# Patient Record
Sex: FEMALE | Race: WHITE | NOT HISPANIC OR LATINO | Employment: FULL TIME | URBAN - METROPOLITAN AREA
[De-identification: names, ages, dates, MRNs, and addresses within clinical notes are randomized per-mention and may not be internally consistent; named-entity substitution may affect disease eponyms.]

---

## 2016-09-14 LAB — HBA1C MFR BLD HPLC: 6.1 %

## 2018-03-28 ENCOUNTER — OFFICE VISIT (OUTPATIENT)
Dept: ENDOCRINOLOGY | Facility: HOSPITAL | Age: 48
End: 2018-03-28
Payer: COMMERCIAL

## 2018-03-28 VITALS
HEART RATE: 94 BPM | HEIGHT: 68 IN | BODY MASS INDEX: 31.52 KG/M2 | SYSTOLIC BLOOD PRESSURE: 130 MMHG | DIASTOLIC BLOOD PRESSURE: 92 MMHG | WEIGHT: 208 LBS

## 2018-03-28 DIAGNOSIS — E23.6 PITUITARY CYST (HCC): Primary | ICD-10-CM

## 2018-03-28 DIAGNOSIS — E22.1 HYPERPROLACTINEMIA (HCC): ICD-10-CM

## 2018-03-28 DIAGNOSIS — D49.7 PITUITARY TUMOR: ICD-10-CM

## 2018-03-28 PROCEDURE — 99203 OFFICE O/P NEW LOW 30 MIN: CPT | Performed by: INTERNAL MEDICINE

## 2018-03-28 RX ORDER — SALICYLIC ACID
POWDER (GRAM) MISCELLANEOUS
COMMUNITY
End: 2019-09-09 | Stop reason: ALTCHOICE

## 2018-03-28 RX ORDER — CHLORAL HYDRATE 500 MG
1000 CAPSULE ORAL DAILY
COMMUNITY

## 2018-03-28 NOTE — LETTER
March 28, 2018     Amada Sinha MD  [address]    Patient: Yuridia Barrientos   YOB: 1970   Date of Visit: 3/28/2018       Dear Dr Pena Sample: Thank you for referring Yuridia Barrientos to me for evaluation  Below are my notes for this consultation  If you have questions, please do not hesitate to call me  I look forward to following your patient along with you  Sincerely,        Renee Perry MD        CC: No Recipients  Renee Perry MD  3/28/2018 11:03 AM  Sign at close encounter  3/28/2018    Assessment/Plan      Diagnoses and all orders for this visit:    Pituitary cyst ([facility] 75 )  -     CBC and differential Lab Collect; Future  -     Comprehensive metabolic panel Lab Collect; Future  -     Prolactin Lab Collect; Future  -     Insulin-like growth factor Lab Collect; Future  -     T4, free Lab Collect; Future  -     TSH, 3rd generation Lab Collect; Future  -     MRI brain pituitary wo and w contrast; Future    Pituitary tumor  -     CBC and differential Lab Collect; Future  -     Comprehensive metabolic panel Lab Collect; Future  -     Prolactin Lab Collect; Future  -     Insulin-like growth factor Lab Collect; Future  -     T4, free Lab Collect; Future  -     TSH, 3rd generation Lab Collect; Future  -     MRI brain pituitary wo and w contrast; Future    Hyperprolactinemia ([facility] )  -     Prolactin Lab Collect; Future    Other orders  -     Phentermine HCl 30 MG TBDP; Take by mouth  -     Omega-3 Fatty Acids (FISH OIL) 1,000 mg; Take 1,000 mg by mouth daily  -     ALBUTEROL SULFATE HFA IN; Inhale  -     Probiotic Product (PROBIOTIC-10 PO); Take by mouth  -     Multiple Vitamins-Minerals (MULTIVITAMIN ADULT PO); Take by mouth  -     Lavender Oil OIL; by Does not apply route daily at bedtime        Assessment/Plan:  1  Pituitary cyst   Last MRI showed stable size pituitary cyst   This will be followed over time with serial MRIs and blood work    2   Pituitary microadenoma  She has a slightly elevated prolactin level, it could be from the microadenoma, but as it is not causing any symptomatology, we will not treat this other than observation  There are no other pituitary hormonal abnormalities  This will be followed with serial MRIs  3   Mild hyperprolactinemia  Her prolactin level is very slightly above normal  It is not causing any symptomatology such as galactorrhea or abnormal menses  No treatment is necessary at this time  This level will be followed over time  I will have her follow up in 1 year with preceding CBC, CMP, TSH, free T4, prolactin, IGF-1 level, and MRI of the pituitary with and without contrast       CC: pituitary consult    History of Present Illness     HPI: Yuridia Barrientos is a 50y o  year old female with history of mild hyperprolactinemia with a pituitary tumor and a pituitary cyst on MRI  In 2015, her gynecologist did a prolactin when she had irregular menses which was abnormal   An MRI was then done and blood work showed only mild hyperprolactinemia  A follow-up MRI in 2016 showed no change in size of her pituitary cyst or pituitary tumor  At that point, prolactin levels had normalized  She has no heat or cold intolerance  She denies palpitation, tremors, or anxiety  She wakes up early at 4:00 a m  to exercise and wakes up a lot in general   She has some more moments at night  She has had recent diarrhea once a month around her menses, but no constipation  She has some dry skin and hair loss, but no brittle nails  She does complain of fatigue  She had been unable to lose weight with diet and exercise, she went on phentermine in October of 2017 and has lost between 8 and 10 lb so far  She has no diplopia or blurry vision  She has no difficulties with swallowing  Menstrual cycles are occurring on a regular monthly basis  She denies galactorrhea  She has no orthostatics symptoms or headache    She has no abdominal pain or nausea  Review of Systems   Constitutional: Positive for fatigue and unexpected weight change  Unable to lose weight with work out and diet changes, on phentermine since October 2017, lost 8-10 lbs so far  HENT: Negative for ear pain, hearing loss, tinnitus and trouble swallowing  Eyes: Negative for visual disturbance  Some worsening reading vision  No loss of vision, blurry vision, diplopia  Respiratory: Negative for chest tightness and shortness of breath  Cardiovascular: Negative for chest pain, palpitations and leg swelling  Gastrointestinal: Positive for diarrhea  Negative for abdominal pain, constipation and nausea  1 day around messes with diarrhea, constipation with phentermine  Endocrine: Negative for cold intolerance, heat intolerance, polydipsia and polyuria  Some hot moments at night  Genitourinary:        Menses regular on a monthly basis  No galactorrhea  Musculoskeletal: Negative for arthralgias and back pain  Skin: Negative for rash  Has some dry skin and hair loss, but no brittle nails  Neurological: Negative for dizziness, tremors, weakness, light-headedness, numbness and headaches  Psychiatric/Behavioral: Positive for sleep disturbance  The patient is not nervous/anxious  Wakes a few times, wakes at 4 am to exercise         Historical Information   Past Medical History:   Diagnosis Date    Asthma     exercise induced    Osteoarthritis     left knee     Past Surgical History:   Procedure Laterality Date    APPENDECTOMY      CYST REMOVAL Right     groin    HAND SURGERY Left     post trauma    KNEE ARTHROSCOPY Left      Social History   History   Alcohol Use No     History   Drug Use No     History   Smoking Status    Never Smoker   Smokeless Tobacco    Never Used     Family History:   Family History   Problem Relation Age of Onset    Melanoma Mother     Hypertension Father     Breast cancer Sister     Hyperlipidemia Sister  No Known Problems Sister     No Known Problems Sister     Polycystic ovary syndrome Daughter     No Known Problems Son        Meds/Allergies   Current Outpatient Prescriptions   Medication Sig Dispense Refill    ALBUTEROL SULFATE HFA IN Inhale      Lavender Oil OIL by Does not apply route daily at bedtime      Multiple Vitamins-Minerals (MULTIVITAMIN ADULT PO) Take by mouth      Omega-3 Fatty Acids (FISH OIL) 1,000 mg Take 1,000 mg by mouth daily      Phentermine HCl 30 MG TBDP Take by mouth      Probiotic Product (PROBIOTIC-10 PO) Take by mouth       No current facility-administered medications for this visit  No Known Allergies    Objective   Vitals: Blood pressure 130/92, pulse 94, height 5' 8 11" (1 73 m), weight 94 3 kg (208 lb)  Invasive Devices          No matching active lines, drains, or airways          Physical Exam   Constitutional: She is oriented to person, place, and time  She appears well-developed and well-nourished  HENT:   Head: Normocephalic and atraumatic  Eyes: Conjunctivae and EOM are normal  Pupils are equal, round, and reactive to light  No lid lag, stare, proptosis, or periorbital  Visual fields intact to confrontation  Neck: Normal range of motion  Neck supple  No thyromegaly present  No carotid bruits  Cardiovascular: Normal rate, regular rhythm, normal heart sounds and intact distal pulses  No murmur heard  Pulmonary/Chest: Effort normal and breath sounds normal  She has no wheezes  Abdominal: Soft  Bowel sounds are normal  There is no tenderness  Musculoskeletal: Normal range of motion  She exhibits no edema or deformity  No tremor of the outstretched hands  No CVAT  No spinous process tenderness  Lymphadenopathy:     She has no cervical adenopathy  Neurological: She is alert and oriented to person, place, and time  She has normal reflexes  Skin: Skin is warm and dry  No rash noted  Vitals reviewed        The history was obtained from the review of the chart and from the patient  Lab Results:   Blood work done on February 21, 2018 showed a normal CMP with a glucose of 87, and sodium of 138  TSH was 1 53 with free T4 1 0  IGF-1 level was 95  Prolactin was 30 8 with normals up to 30  MRI done on 05/04/2016 showed a 2-3 mm pituitary cyst in the left side of the pituitary and a 5 mm microadenoma in the inferior aspect  These were unchanged from MRI in 2015  No results found for this or any previous visit (from the past 18278 hour(s))        Future Appointments  Date Time Provider Kash Thorpe   4/3/2019 8:40 AM Trent Hernandez MD [facility]

## 2018-03-28 NOTE — PROGRESS NOTES
3/28/2018    Assessment/Plan      Diagnoses and all orders for this visit:    Pituitary cyst ([facility] 75 )  -     CBC and differential Lab Collect; Future  -     Comprehensive metabolic panel Lab Collect; Future  -     Prolactin Lab Collect; Future  -     Insulin-like growth factor Lab Collect; Future  -     T4, free Lab Collect; Future  -     TSH, 3rd generation Lab Collect; Future  -     MRI brain pituitary wo and w contrast; Future    Pituitary tumor  -     CBC and differential Lab Collect; Future  -     Comprehensive metabolic panel Lab Collect; Future  -     Prolactin Lab Collect; Future  -     Insulin-like growth factor Lab Collect; Future  -     T4, free Lab Collect; Future  -     TSH, 3rd generation Lab Collect; Future  -     MRI brain pituitary wo and w contrast; Future    Hyperprolactinemia ([facility] )  -     Prolactin Lab Collect; Future    Other orders  -     Phentermine HCl 30 MG TBDP; Take by mouth  -     Omega-3 Fatty Acids (FISH OIL) 1,000 mg; Take 1,000 mg by mouth daily  -     ALBUTEROL SULFATE HFA IN; Inhale  -     Probiotic Product (PROBIOTIC-10 PO); Take by mouth  -     Multiple Vitamins-Minerals (MULTIVITAMIN ADULT PO); Take by mouth  -     Lavender Oil OIL; by Does not apply route daily at bedtime        Assessment/Plan:  1  Pituitary cyst   Last MRI showed stable size pituitary cyst   This will be followed over time with serial MRIs and blood work  2   Pituitary microadenoma  She has a slightly elevated prolactin level, it could be from the microadenoma, but as it is not causing any symptomatology, we will not treat this other than observation  There are no other pituitary hormonal abnormalities  This will be followed with serial MRIs  3   Mild hyperprolactinemia  Her prolactin level is very slightly above normal  It is not causing any symptomatology such as galactorrhea or abnormal menses  No treatment is necessary at this time  This level will be followed over time    I will have her follow up in 1 year with preceding CBC, CMP, TSH, free T4, prolactin, IGF-1 level, and MRI of the pituitary with and without contrast       CC: pituitary consult    History of Present Illness     HPI: Delia Multani is a 50y o  year old female with history of mild hyperprolactinemia with a pituitary tumor and a pituitary cyst on MRI  In 2015, her gynecologist did a prolactin when she had irregular menses which was abnormal   An MRI was then done and blood work showed only mild hyperprolactinemia  A follow-up MRI in 2016 showed no change in size of her pituitary cyst or pituitary tumor  At that point, prolactin levels had normalized  She has no heat or cold intolerance  She denies palpitation, tremors, or anxiety  She wakes up early at 4:00 a m  to exercise and wakes up a lot in general   She has some more moments at night  She has had recent diarrhea once a month around her menses, but no constipation  She has some dry skin and hair loss, but no brittle nails  She does complain of fatigue  She had been unable to lose weight with diet and exercise, she went on phentermine in October of 2017 and has lost between 8 and 10 lb so far  She has no diplopia or blurry vision  She has no difficulties with swallowing  Menstrual cycles are occurring on a regular monthly basis  She denies galactorrhea  She has no orthostatics symptoms or headache  She has no abdominal pain or nausea  Review of Systems   Constitutional: Positive for fatigue and unexpected weight change  Unable to lose weight with work out and diet changes, on phentermine since October 2017, lost 8-10 lbs so far  HENT: Negative for ear pain, hearing loss, tinnitus and trouble swallowing  Eyes: Negative for visual disturbance  Some worsening reading vision  No loss of vision, blurry vision, diplopia  Respiratory: Negative for chest tightness and shortness of breath  Cardiovascular: Negative for chest pain, palpitations and leg swelling  Gastrointestinal: Positive for diarrhea  Negative for abdominal pain, constipation and nausea  1 day around messes with diarrhea, constipation with phentermine  Endocrine: Negative for cold intolerance, heat intolerance, polydipsia and polyuria  Some hot moments at night  Genitourinary:        Menses regular on a monthly basis  No galactorrhea  Musculoskeletal: Negative for arthralgias and back pain  Skin: Negative for rash  Has some dry skin and hair loss, but no brittle nails  Neurological: Negative for dizziness, tremors, weakness, light-headedness, numbness and headaches  Psychiatric/Behavioral: Positive for sleep disturbance  The patient is not nervous/anxious  Wakes a few times, wakes at 4 am to exercise         Historical Information   Past Medical History:   Diagnosis Date    Asthma     exercise induced    Osteoarthritis     left knee     Past Surgical History:   Procedure Laterality Date    APPENDECTOMY      CYST REMOVAL Right     groin    HAND SURGERY Left     post trauma    KNEE ARTHROSCOPY Left      Social History   History   Alcohol Use No     History   Drug Use No     History   Smoking Status    Never Smoker   Smokeless Tobacco    Never Used     Family History:   Family History   Problem Relation Age of Onset   [facility] Melanoma Mother     Hypertension Father     Breast cancer Sister     Hyperlipidemia Sister     No Known Problems Sister     No Known Problems Sister     Polycystic ovary syndrome Daughter     No Known Problems Son        Meds/Allergies   Current Outpatient Prescriptions   Medication Sig Dispense Refill    ALBUTEROL SULFATE HFA IN Inhale      Lavender Oil OIL by Does not apply route daily at bedtime      Multiple Vitamins-Minerals (MULTIVITAMIN ADULT PO) Take by mouth      Omega-3 Fatty Acids (FISH OIL) 1,000 mg Take 1,000 mg by mouth daily      Phentermine HCl 30 MG TBDP Take by mouth      Probiotic Product (PROBIOTIC-10 PO) Take by mouth       No current facility-administered medications for this visit  No Known Allergies    Objective   Vitals: Blood pressure 130/92, pulse 94, height 5' 8 11" (1 73 m), weight 94 3 kg (208 lb)  Invasive Devices          No matching active lines, drains, or airways          Physical Exam   Constitutional: She is oriented to person, place, and time  She appears well-developed and well-nourished  HENT:   Head: Normocephalic and atraumatic  Eyes: Conjunctivae and EOM are normal  Pupils are equal, round, and reactive to light  No lid lag, stare, proptosis, or periorbital  Visual fields intact to confrontation  Neck: Normal range of motion  Neck supple  No thyromegaly present  No carotid bruits  Cardiovascular: Normal rate, regular rhythm, normal heart sounds and intact distal pulses  No murmur heard  Pulmonary/Chest: Effort normal and breath sounds normal  She has no wheezes  Abdominal: Soft  Bowel sounds are normal  There is no tenderness  Musculoskeletal: Normal range of motion  She exhibits no edema or deformity  No tremor of the outstretched hands  No CVAT  No spinous process tenderness  Lymphadenopathy:     She has no cervical adenopathy  Neurological: She is alert and oriented to person, place, and time  She has normal reflexes  Skin: Skin is warm and dry  No rash noted  Vitals reviewed  The history was obtained from the review of the chart and from the patient  Lab Results:   Blood work done on February 21, 2018 showed a normal CMP with a glucose of 87, and sodium of 138  TSH was 1 53 with free T4 1 0  IGF-1 level was 95  Prolactin was 30 8 with normals up to 30  MRI done on 05/04/2016 showed a 2-3 mm pituitary cyst in the left side of the pituitary and a 5 mm microadenoma in the inferior aspect  These were unchanged from MRI in 2015  No results found for this or any previous visit (from the past 96141 hour(s))        Future Appointments  Date Time Provider Kash Thorpe   4/3/2019 8:40 AM Kiara Andres MD [facility]

## 2018-03-28 NOTE — PATIENT INSTRUCTIONS
Blood work normal except slightly elevated prolactin that is nonworrisome and not affecting her your health  Will need MRI before next visit  Follow up in 1 year with blood work

## 2018-12-05 ENCOUNTER — TELEPHONE (OUTPATIENT)
Dept: ENDOCRINOLOGY | Facility: HOSPITAL | Age: 48
End: 2018-12-05

## 2018-12-05 NOTE — TELEPHONE ENCOUNTER
Called for Authorization and spoke to Alberta ALMEIDA at 5:05PM and he stated that this does not need pre-certification

## 2018-12-05 NOTE — TELEPHONE ENCOUNTER
Received call from Ho at [facility], [facility], patient is scheduled for MRI tomorrow, their NPI is [id number]  The MRI needs an authorization

## 2018-12-05 NOTE — TELEPHONE ENCOUNTER
All you have to do is call her insurance and initiate a authorization  They ask you questions over the phone

## 2018-12-05 NOTE — TELEPHONE ENCOUNTER
Tried to call the number on the back of her card, none of the options were for pre-certifications or authorizations for a procedure   I tried two different options and they both were incorrect

## 2018-12-18 ENCOUNTER — TELEPHONE (OUTPATIENT)
Dept: ENDOCRINOLOGY | Facility: HOSPITAL | Age: 48
End: 2018-12-18

## 2019-03-24 LAB
ALBUMIN SERPL-MCNC: 4 G/DL (ref 3.6–5.1)
ALBUMIN/GLOB SERPL: 1.3 (CALC) (ref 1–2.5)
ALP SERPL-CCNC: 78 U/L (ref 33–115)
ALT SERPL-CCNC: 14 U/L (ref 6–29)
AST SERPL-CCNC: 14 U/L (ref 10–35)
BASOPHILS # BLD AUTO: 43 CELLS/UL (ref 0–200)
BASOPHILS NFR BLD AUTO: 0.5 %
BILIRUB SERPL-MCNC: 0.5 MG/DL (ref 0.2–1.2)
BUN SERPL-MCNC: 13 MG/DL (ref 7–25)
BUN/CREAT SERPL: NORMAL (CALC) (ref 6–22)
CALCIUM SERPL-MCNC: 9.5 MG/DL (ref 8.6–10.2)
CHLORIDE SERPL-SCNC: 101 MMOL/L (ref 98–110)
CO2 SERPL-SCNC: 29 MMOL/L (ref 20–32)
CREAT SERPL-MCNC: 0.72 MG/DL (ref 0.5–1.1)
EOSINOPHIL # BLD AUTO: 292 CELLS/UL (ref 15–500)
EOSINOPHIL NFR BLD AUTO: 3.4 %
ERYTHROCYTE [DISTWIDTH] IN BLOOD BY AUTOMATED COUNT: 11.9 % (ref 11–15)
GLOBULIN SER CALC-MCNC: 3.1 G/DL (CALC) (ref 1.9–3.7)
GLUCOSE SERPL-MCNC: 95 MG/DL (ref 65–99)
HCT VFR BLD AUTO: 40.3 % (ref 35–45)
HGB BLD-MCNC: 13.1 G/DL (ref 11.7–15.5)
IGF-I SERPL-MCNC: 100 NG/ML (ref 52–328)
IGF-I Z-SCORE SERPL: -0.7 SD
LYMPHOCYTES # BLD AUTO: 2245 CELLS/UL (ref 850–3900)
LYMPHOCYTES NFR BLD AUTO: 26.1 %
MCH RBC QN AUTO: 27.6 PG (ref 27–33)
MCHC RBC AUTO-ENTMCNC: 32.5 G/DL (ref 32–36)
MCV RBC AUTO: 84.8 FL (ref 80–100)
MONOCYTES # BLD AUTO: 516 CELLS/UL (ref 200–950)
MONOCYTES NFR BLD AUTO: 6 %
NEUTROPHILS # BLD AUTO: 5504 CELLS/UL (ref 1500–7800)
NEUTROPHILS NFR BLD AUTO: 64 %
PLATELET # BLD AUTO: 451 THOUSAND/UL (ref 140–400)
PMV BLD REES-ECKER: 10.6 FL (ref 7.5–12.5)
POTASSIUM SERPL-SCNC: 4.3 MMOL/L (ref 3.5–5.3)
PROLACTIN SERPL-MCNC: 26.5 NG/ML
PROT SERPL-MCNC: 7.1 G/DL (ref 6.1–8.1)
RBC # BLD AUTO: 4.75 MILLION/UL (ref 3.8–5.1)
SL AMB EGFR AFRICAN AMERICAN: 114 ML/MIN/1.73M2
SL AMB EGFR NON AFRICAN AMERICAN: 98 ML/MIN/1.73M2
SODIUM SERPL-SCNC: 137 MMOL/L (ref 135–146)
T4 FREE SERPL-MCNC: 0.9 NG/DL (ref 0.8–1.8)
TSH SERPL-ACNC: 1.92 MIU/L
WBC # BLD AUTO: 8.6 THOUSAND/UL (ref 3.8–10.8)

## 2019-04-03 ENCOUNTER — OFFICE VISIT (OUTPATIENT)
Dept: ENDOCRINOLOGY | Facility: HOSPITAL | Age: 49
End: 2019-04-03
Payer: COMMERCIAL

## 2019-04-03 VITALS
HEART RATE: 82 BPM | BODY MASS INDEX: 30.77 KG/M2 | HEIGHT: 68 IN | SYSTOLIC BLOOD PRESSURE: 130 MMHG | DIASTOLIC BLOOD PRESSURE: 92 MMHG | WEIGHT: 203 LBS

## 2019-04-03 DIAGNOSIS — D49.7 PITUITARY TUMOR: Primary | ICD-10-CM

## 2019-04-03 DIAGNOSIS — E23.6 PITUITARY CYST (HCC): ICD-10-CM

## 2019-04-03 DIAGNOSIS — E22.1 HYPERPROLACTINEMIA (HCC): ICD-10-CM

## 2019-04-03 PROCEDURE — 99214 OFFICE O/P EST MOD 30 MIN: CPT | Performed by: INTERNAL MEDICINE

## 2019-09-06 ENCOUNTER — DOCUMENTATION (OUTPATIENT)
Dept: HEMATOLOGY ONCOLOGY | Facility: CLINIC | Age: 49
End: 2019-09-06

## 2019-09-06 ENCOUNTER — TELEPHONE (OUTPATIENT)
Dept: SURGICAL ONCOLOGY | Facility: CLINIC | Age: 49
End: 2019-09-06

## 2019-09-06 PROBLEM — C50.412 MALIGNANT NEOPLASM OF UPPER-OUTER QUADRANT OF LEFT BREAST IN FEMALE, ESTROGEN RECEPTOR POSITIVE (HCC): Status: ACTIVE | Noted: 2019-09-06

## 2019-09-06 PROBLEM — Z17.0 MALIGNANT NEOPLASM OF UPPER-OUTER QUADRANT OF LEFT BREAST IN FEMALE, ESTROGEN RECEPTOR POSITIVE (HCC): Status: ACTIVE | Noted: 2019-09-06

## 2019-09-06 NOTE — TELEPHONE ENCOUNTER
Called insurance & spoke to [address] [place] [address] call ref# [id number]   Pt has an active open access plus plan & [place] is in the pts network & par  The plan runs on a cal year w/an effective date of 12/01/15  Not a cobra plan  Pt has chemo & radiation benefits that have a $3000 deduct that has been met  Co-ions is 80/20 until the out of pocket of $4000 is met  Pt has met $2141 60  Once the out of  Pocket is met then pt covered 100%  Chemo drugs are in the 80/20 no co-pays  pcp & specialist & ER has the 20% co-ins that is based on the allowed amount until the out of pocket is met  Dx imaging has a $50 co-pay then co-in that will go towards the out of pocket  X-rays & labs & in pt hospitalizations & out pt surgery is the same benefit the deduct then co-ins that will go towards the out of pocket  Labs are not capitated  Genetic testing is under the labs & nd codes for genomic testing  rx plan thru [facility] [phone] speciality pharmacy [facility]  Called pt to go over the benefits but got her voicemail  left her a message to call me back

## 2019-09-06 NOTE — PROGRESS NOTES
Pt called & I went over her benefits w/her & she sd that she will call me back after her visit to let me know what the treatment plan will be & then I will call back to the insurance to make sure it s all covered for the pt

## 2019-09-06 NOTE — TELEPHONE ENCOUNTER
New Patient Encounter    New Patient Intake Form   Patient Details:  Andreia Gore  1970  [id number]    Background Information:  [address] starts by opening a telephone encounter and gathering the following information   Who is calling to schedule? If not self, relationship to patient? self   Referring Provider self   What is the diagnosis? Breast ca   When was the diagnosis? 8/2019   Is patient aware of diagnosis? Yes   Reason for visit? Second Opinion   Have you had any testing done? If so: when, where? Yes [facility]   Are records in EPIC? no   Was the patient told to bring a disk? yes   Scheduling Information:   Preferred [place]: [facility]     Requesting Specific Provider? EDUARD   Are there any dates/time the patient cannot be seen? NO   Counseling Pre-Screen:  If the patient answers YES to any of the below questions, please route to the appropriate location specific counselor    Have you felt anxious or worried about cancer and the treatment you are receiving? No   Has your diagnosis caused physical, emotional, or financial hardship for you? No   Note: Do not ask the patient about transportation issues/needs  Please notate if the patient brings it up and the counselor will schedule accordingly  Miscellaneous: NA    After completing the above information, please route to Financial Counselor and the appropriate Nurse Navigator for review

## 2019-09-09 ENCOUNTER — CONSULT (OUTPATIENT)
Dept: SURGICAL ONCOLOGY | Facility: CLINIC | Age: 49
End: 2019-09-09
Payer: COMMERCIAL

## 2019-09-09 VITALS
SYSTOLIC BLOOD PRESSURE: 120 MMHG | HEART RATE: 94 BPM | HEIGHT: 68 IN | WEIGHT: 220 LBS | DIASTOLIC BLOOD PRESSURE: 80 MMHG | BODY MASS INDEX: 33.34 KG/M2 | RESPIRATION RATE: 16 BRPM | TEMPERATURE: 98.1 F

## 2019-09-09 DIAGNOSIS — Z17.0 MALIGNANT NEOPLASM OF UPPER-OUTER QUADRANT OF LEFT BREAST IN FEMALE, ESTROGEN RECEPTOR POSITIVE (HCC): Primary | ICD-10-CM

## 2019-09-09 DIAGNOSIS — Z80.3 FAMILY HISTORY OF BREAST CANCER: ICD-10-CM

## 2019-09-09 DIAGNOSIS — Z13.71 BRCA GENE MUTATION NEGATIVE: ICD-10-CM

## 2019-09-09 DIAGNOSIS — C50.412 MALIGNANT NEOPLASM OF UPPER-OUTER QUADRANT OF LEFT BREAST IN FEMALE, ESTROGEN RECEPTOR POSITIVE (HCC): Primary | ICD-10-CM

## 2019-09-09 PROCEDURE — 99245 OFF/OP CONSLTJ NEW/EST HI 55: CPT | Performed by: SURGERY

## 2019-09-09 NOTE — PROGRESS NOTES
Surgical Oncology Consult Note       [address] [facility],6[address]  [facility] [place]  [address] [facility] [address]  1970  [id number]  [address] [facility],[address]  [facility] [place]  [address] [facility] [id number]      Chief Complaint:     Chief Complaint   Patient presents with    Consult     Second opinion    Breast Cancer     New diagnosis       Assessment and Plan:   Assessment/Plan   Patient presents with a new diagnosis of left breast invasive cancer  She has 2 4 mm lesions approximately a cm apart  Strongly ER NV positive HER2 negative  She has had a prior consult and is having a 3rd consult [facility]  I have recommended breast conservation therapy  She was interested intraoperative radiation therapy however we typically would not perform this for someone of her age though there other centers in the country who would  She will contact us following her 3rd consult if we can provide her any additional information and/or services  All questions were answered to the patient's satisfaction  Oncology History:        Malignant neoplasm of upper-outer quadrant of left breast in female, estrogen receptor positive ([facility] [address] )    8/16/2019 Biopsy     Left breast ultrasound-guided biopsy  2 o'clock, 9 cm from nipple (2 closely adjacent masses)  Invasive mammary carcinoma, ductal type  Grade 2      HER2 1+    Both masses measure 4 mm on US  Left axilla US negative  Right breast clear  Biopsy done at [facility]      8/21/2019 Genetic Testing     The following genes were evaluated: APC, BEATRICE, AXIN2, BARD 1, BMPR1A, BRCA1, BRCA2, BRIP1, CDH1, CKD4, CDKN24, CHEK2, EPCAM, HOXB13, GALNT12, MLH1, MSH2, MSH3, MSH6, MUTYH, NBN, NTHL1, PALB2, PMS2, PTEN, RAD51C, RAD51D, RNF43, RPS20, SMAD4, STK11, TP53   Negative result   No pathogenic sequence variants or deletions/dupllications identified  [facility])         History of Present Illness: This is a 42-year-old woman who has a history of a prolactinoma and had a screening mammogram which demonstrated no findings on the right side however on the left side she had some abnormalities  She subsequent had a diagnostic mammogram and ultrasound which demonstrated in the upper outer quadrant ( 2:00 a m  9 cm from the nipple ) two 4 mm masses, the clips pain and 1 4 cm apart  These were biopsied and shown to be invasive ductal carcinoma grade 2 % % HER2 Ashli 1+  Evaluation of her axilla with ultrasound was negative  The patient has a sister with breast cancer the sisters genetically negative  The patient was also tested recently with the expanded panel and is negative  The patient has had an MRI which demonstrated no additional disease though  There is considerable activity in the MRI compromising its sensitivity,  However was able to  there are 2 small cancers  The patient's family history is remarkable for her sister having breast cancer both the sister and the patient  Review of Systems:   Review of Systems   Constitutional: Negative for activity change, appetite change and fatigue  HENT: Negative  Eyes: Negative  Respiratory: Negative for cough, shortness of breath and wheezing  Cardiovascular: Negative for chest pain and leg swelling  Gastrointestinal: Negative  Endocrine: Negative  Genitourinary: Negative  Musculoskeletal:        No new changes or complaints of bone pain   Skin: Negative  Allergic/Immunologic: Negative  Neurological: Negative  Hematological: Negative  Psychiatric/Behavioral: Negative          Past Medical History:      Patient Active Problem List   Diagnosis    Pituitary cyst ([address] )    Pituitary tumor    Hyperprolactinemia ([address] )    Malignant neoplasm of upper-outer quadrant of left breast in female, estrogen receptor positive ([address] )        Past Medical History:   Diagnosis Date    Asthma     exercise induced    BRCA gene mutation negative 08/21/2019    [facility])    History of prediabetes     Osteoarthritis     left knee        Past Surgical History:   Procedure Laterality Date    APPENDECTOMY      CYST REMOVAL Right     groin    HAND SURGERY Left     post trauma    KNEE ARTHROSCOPY Left         Family History   Problem Relation Age of Onset    Melanoma Mother 40    Hypertension Father     Breast cancer Sister 50    Hyperlipidemia Sister     No Known Problems Sister     No Known Problems Sister     Polycystic ovary syndrome Daughter     No Known Problems Son         Social History     Socioeconomic History    Marital status: /Civil Union     Spouse name: Not on file    Number of children: Not on file    Years of education: Not on file    Highest education level: Not on file   Occupational History    Occupation: billing, insurance/administration   Social Needs    Financial resource strain: Not on file    Food insecurity:     Worry: Not on file     Inability: Not on file    Transportation needs:     Medical: Not on file     Non-medical: Not on file   Tobacco Use    Smoking status: Never Smoker    Smokeless tobacco: Never Used   Substance and Sexual Activity    Alcohol use: No    Drug use: No    Sexual activity: Not on file   Lifestyle    Physical activity:     Days per week: Not on file     Minutes per session: Not on file    Stress: Not on file   Relationships    Social connections:     Talks on phone: Not on file     Gets together: Not on file     Attends Confucianism service: Not on file     Active member of club or organization: Not on file     Attends meetings of clubs or organizations: Not on file     Relationship status: Not on file    Intimate partner violence:     Fear of current or ex partner: Not on file     Emotionally abused: Not on file     Physically abused: Not on file     Forced sexual activity: Not on file   Other Topics Concern    Not on file   Social History Narrative    Not on file        Current Outpatient Medications:     ALBUTEROL SULFATE HFA IN, Inhale prn, Disp: , Rfl:     Multiple Vitamins-Minerals (MULTIVITAMIN ADULT PO), Take by mouth, Disp: , Rfl:     Omega-3 Fatty Acids (FISH OIL) 1,000 mg, Take 1,000 mg by mouth daily, Disp: , Rfl:     Probiotic Product (PROBIOTIC-10 PO), Take by mouth as needed , Disp: , Rfl:    No Known Allergies    Physical Exam:     Vitals:    09/09/19 0747   BP: 120/80   Pulse: 94   Resp: 16   Temp: 98 1 °F (36 7 °C)     Physical Exam   Constitutional: She is oriented to person, place, and time  She appears well-developed and well-nourished  HENT:   Head: Normocephalic and atraumatic  Mouth/Throat: Oropharynx is clear and moist    Eyes: Pupils are equal, round, and reactive to light  EOM are normal    Neck: Normal range of motion  Neck supple  No JVD present  No tracheal deviation present  No thyromegaly present  Cardiovascular: Normal rate, regular rhythm, normal heart sounds and intact distal pulses  Exam reveals no gallop and no friction rub  No murmur heard  Pulmonary/Chest: Effort normal and breath sounds normal  No respiratory distress  She has no wheezes  She has no rales  Examination of the right breast demonstrates no skin changes nipple discharge dominant masses or axillary adenopathy  Examination of the left breast demonstrates well-healed biopsy marks with no ecchymosis or hematoma  There is no nipple discharge dominant masses or axillary adenopathy  Abdominal: Soft  She exhibits no distension and no mass  There is no hepatomegaly  There is no tenderness  There is no rebound and no guarding  Musculoskeletal: Normal range of motion  She exhibits no edema or tenderness  Lymphadenopathy:     She has no cervical adenopathy  Neurological: She is alert and oriented to person, place, and time  No cranial nerve deficit  Skin: Skin is warm and dry   No rash noted  No erythema  Psychiatric: She has a normal mood and affect  Her behavior is normal    Vitals reviewed  Results:     I reviewed her outside images consistent of her mammogram   This demonstrates the 2 tumors are approximately 1 4 cm apart in both the MLO and cc projection  I think this could easily being complex with a small lumpectomy  Discussion/Summary:     Clinically this is a stage I ER positive DC positive HER2 negative breast cancer  I recommended breast conservation therapy  The patient was interested in intraoperative radiation therapy however I explained we typically limit that to patient's over 60 until we have more extensive data in the younger sub group  We talked about different types of radiation therapy  All questions were answered the patient's satisfaction  The patient will contact us if we could provide her any additional information  Advance Care Planning/Advance Directives:  I discussed the disease status, treatment plans and follow-up with the patient

## 2019-09-12 ENCOUNTER — TELEPHONE (OUTPATIENT)
Dept: SURGICAL ONCOLOGY | Facility: CLINIC | Age: 49
End: 2019-09-12

## 2019-09-12 NOTE — TELEPHONE ENCOUNTER
Called patient regarding an e-mail that was sent asking for a copy of her consult note with Dr Shani Carson  Patient states she needs a copy of the note for insurance purposes since her visit was for a second opinion  Patient states that she will be proceeding with her treatment through [facility] but was appreciative of the second opinion with Dr Shani Carson  Informed patient that a copy of her visit note would be emailed to her  Patient denies any additional questions or concerns at this time

## 2020-07-14 LAB
ALBUMIN SERPL-MCNC: 3.6 G/DL (ref 3.6–5.1)
ALBUMIN/GLOB SERPL: 1.2 (CALC) (ref 1–2.5)
ALP SERPL-CCNC: 47 U/L (ref 37–153)
ALT SERPL-CCNC: 7 U/L (ref 6–29)
AST SERPL-CCNC: 12 U/L (ref 10–35)
BASOPHILS # BLD AUTO: 29 CELLS/UL (ref 0–200)
BASOPHILS NFR BLD AUTO: 0.4 %
BILIRUB SERPL-MCNC: 0.4 MG/DL (ref 0.2–1.2)
BUN SERPL-MCNC: 13 MG/DL (ref 7–25)
BUN/CREAT SERPL: NORMAL (CALC) (ref 6–22)
CALCIUM SERPL-MCNC: 9 MG/DL (ref 8.6–10.4)
CHLORIDE SERPL-SCNC: 104 MMOL/L (ref 98–110)
CO2 SERPL-SCNC: 28 MMOL/L (ref 20–32)
CREAT SERPL-MCNC: 0.71 MG/DL (ref 0.5–1.05)
EOSINOPHIL # BLD AUTO: 343 CELLS/UL (ref 15–500)
EOSINOPHIL NFR BLD AUTO: 4.7 %
ERYTHROCYTE [DISTWIDTH] IN BLOOD BY AUTOMATED COUNT: 12.1 % (ref 11–15)
GLOBULIN SER CALC-MCNC: 2.9 G/DL (CALC) (ref 1.9–3.7)
GLUCOSE SERPL-MCNC: 96 MG/DL (ref 65–99)
HCT VFR BLD AUTO: 37.4 % (ref 35–45)
HGB BLD-MCNC: 12 G/DL (ref 11.7–15.5)
IGF-I SERPL-MCNC: 44 NG/ML (ref 50–317)
IGF-I Z-SCORE SERPL: -2.2 SD
LYMPHOCYTES # BLD AUTO: 1840 CELLS/UL (ref 850–3900)
LYMPHOCYTES NFR BLD AUTO: 25.2 %
MCH RBC QN AUTO: 27.7 PG (ref 27–33)
MCHC RBC AUTO-ENTMCNC: 32.1 G/DL (ref 32–36)
MCV RBC AUTO: 86.4 FL (ref 80–100)
MONOCYTES # BLD AUTO: 511 CELLS/UL (ref 200–950)
MONOCYTES NFR BLD AUTO: 7 %
NEUTROPHILS # BLD AUTO: 4577 CELLS/UL (ref 1500–7800)
NEUTROPHILS NFR BLD AUTO: 62.7 %
PLATELET # BLD AUTO: 300 THOUSAND/UL (ref 140–400)
PMV BLD REES-ECKER: 11.3 FL (ref 7.5–12.5)
POTASSIUM SERPL-SCNC: 4.2 MMOL/L (ref 3.5–5.3)
PROLACTIN SERPL-MCNC: 16.6 NG/ML
PROT SERPL-MCNC: 6.5 G/DL (ref 6.1–8.1)
RBC # BLD AUTO: 4.33 MILLION/UL (ref 3.8–5.1)
SL AMB EGFR AFRICAN AMERICAN: 115 ML/MIN/1.73M2
SL AMB EGFR NON AFRICAN AMERICAN: 99 ML/MIN/1.73M2
SODIUM SERPL-SCNC: 137 MMOL/L (ref 135–146)
T4 FREE SERPL-MCNC: 1 NG/DL (ref 0.8–1.8)
TSH SERPL-ACNC: 2.13 MIU/L
WBC # BLD AUTO: 7.3 THOUSAND/UL (ref 3.8–10.8)

## 2020-09-09 ENCOUNTER — OFFICE VISIT (OUTPATIENT)
Dept: ENDOCRINOLOGY | Facility: HOSPITAL | Age: 50
End: 2020-09-09
Payer: COMMERCIAL

## 2020-09-09 VITALS
HEIGHT: 68 IN | TEMPERATURE: 98.2 F | BODY MASS INDEX: 34.74 KG/M2 | HEART RATE: 76 BPM | WEIGHT: 229.2 LBS | DIASTOLIC BLOOD PRESSURE: 80 MMHG | SYSTOLIC BLOOD PRESSURE: 130 MMHG

## 2020-09-09 DIAGNOSIS — E22.1 HYPERPROLACTINEMIA (HCC): ICD-10-CM

## 2020-09-09 DIAGNOSIS — D49.7 PITUITARY TUMOR: Primary | ICD-10-CM

## 2020-09-09 DIAGNOSIS — E23.6 PITUITARY CYST (HCC): ICD-10-CM

## 2020-09-09 PROCEDURE — 99214 OFFICE O/P EST MOD 30 MIN: CPT | Performed by: INTERNAL MEDICINE

## 2020-09-09 RX ORDER — TAMOXIFEN CITRATE 20 MG/1
20 TABLET ORAL DAILY
COMMUNITY
End: 2021-09-15 | Stop reason: ALTCHOICE

## 2020-09-09 RX ORDER — METFORMIN HYDROCHLORIDE 500 MG/1
500 TABLET, EXTENDED RELEASE ORAL
COMMUNITY

## 2020-09-09 NOTE — PROGRESS NOTES
9/10/2020    Assessment/Plan      Diagnoses and all orders for this visit:    Pituitary tumor  -     T4, free Lab Collect; Future  -     TSH, 3rd generation Lab Collect; Future  -     Comprehensive metabolic panel Lab Collect; Future  -     CBC and differential Lab Collect; Future  -     Cortisol Level, AM Specimen Lab Collect; Future  -     Prolactin Lab Collect; Future  -     Insulin-like growth factor 1 (IGF-1) - Lab Collect; Future    Pituitary cyst (HCC)  -     T4, free Lab Collect; Future  -     TSH, 3rd generation Lab Collect; Future  -     Comprehensive metabolic panel Lab Collect; Future  -     CBC and differential Lab Collect; Future  -     Cortisol Level, AM Specimen Lab Collect; Future  -     Prolactin Lab Collect; Future  -     Insulin-like growth factor 1 (IGF-1) - Lab Collect; Future    Hyperprolactinemia (HCC)  -     T4, free Lab Collect; Future  -     TSH, 3rd generation Lab Collect; Future  -     Comprehensive metabolic panel Lab Collect; Future  -     CBC and differential Lab Collect; Future  -     Cortisol Level, AM Specimen Lab Collect; Future  -     Prolactin Lab Collect; Future  -     Insulin-like growth factor 1 (IGF-1) - Lab Collect; Future    Other orders  -     tamoxifen (NOLVADEX) 20 mg tablet; Take 20 mg by mouth daily  -     metFORMIN (GLUCOPHAGE-XR) 500 mg 24 hr tablet; Take 500 mg by mouth daily with dinner        Assessment/Plan:  1  Pituitary tumor/cyst   Her most recent blood work is all normal   She has no evidence of pituitary hormonal excess or deficiency  Last MRI was 2018 so her next MRI is due in 2023  I will continue to follow her pituitary hormonal function yearly  2  Hyperprolactinemia  Most recent prolactin level is normal   I will continue to follow this and her pituitary hormonal function over time  I have asked her to follow up in 1 year with preceding CMP, CBC, a m   Cortisol, IGF-1 level, prolactin, TSH, and free T4       CC:  Prolactin and pituitary follow-up    History of Present Illness     HPI: Jayla Rodriguez is a 48y o  year old female with history of mild hyperprolactinemia with pituitary tumor and cyst for follow-up visit  This was discovered in 2015 when she had irregular menses and was worked up with an elevated prolactin being noted  She is not need to medical treatment for her hyperprolactinemia as of this point  Her last MRI was 2018 and showed a stable size 5 mm pituitary cyst/adenoma in the inferior aspect  Her last prolactin level in 2019 was normal   She has not needed any treatment at this point other than observation  She denies heat or cold intolerance but is getting some hot flashes  Menstrual cycles have been absent for 3 months  She had regular menses prior to this  She denies galactorrhea  Weight is 26 lb more than last year with the various treatments of her breast cancer in less activity  She denies palpitation, tremors, or fatigue  She denies diarrhea or constipation, anxiety or depression  She reports sleeping is interrupted by have flashes  She has dry skin but no brittle nails or hair loss  She denies orthostatics symptoms or nausea  She has no visual changes or headaches  Review of Systems   Constitutional: Positive for unexpected weight change  Negative for fatigue  Weight 26 lb more than last year  HENT: Negative for trouble swallowing  Eyes: Negative for visual disturbance  No diplopia  No loss of vision  Respiratory: Negative for chest tightness and shortness of breath  Cardiovascular: Negative for chest pain and palpitations  Gastrointestinal: Negative for abdominal pain, constipation, diarrhea and nausea  Endocrine: Negative for cold intolerance and heat intolerance  Getting some hot flashes  Genitourinary:        No menses for 3 months  Menses were mostly regular prior to this  No galactorrhea  Skin: Negative for rash  Has dry skin  No brittle nails   No hair loss    Neurological: Negative for dizziness, tremors, light-headedness and headaches  Psychiatric/Behavioral: Positive for sleep disturbance  Negative for dysphoric mood  The patient is not nervous/anxious  Sleeping interrupted by hot flashes  Historical Information   Past Medical History:   Diagnosis Date    Asthma     exercise induced    BRCA gene mutation [facility])    Breast cancer [facility])     left lumpectomy and XRT    History of prediabetes     Osteoarthritis     left knee     Past Surgical History:   Procedure Laterality Date    APPENDECTOMY      BREAST LUMPECTOMY Left 09/2019    with sentinal lymph nodes all negative    CYST REMOVAL Right     groin    HAND SURGERY Left     post trauma    KNEE ARTHROSCOPY Left      Social History   Social History     Substance and Sexual Activity   Alcohol Use No     Social History     Substance and Sexual Activity   Drug Use No     Social History     Tobacco Use   Smoking Status Never Smoker   Smokeless Tobacco Never Used     Family History:   Family History   Problem Relation Age of Onset    Melanoma Mother 40    Hypertension Father     Breast cancer Sister 50    Hyperlipidemia Sister     No Known Problems Sister     No Known Problems Sister     Polycystic ovary syndrome Daughter     No Known Problems Son        Meds/Allergies   Current Outpatient Medications   Medication Sig Dispense Refill    ALBUTEROL SULFATE HFA IN Inhale prn      metFORMIN (GLUCOPHAGE-XR) 500 mg 24 hr tablet Take 500 mg by mouth daily with dinner      Multiple Vitamins-Minerals (MULTIVITAMIN ADULT PO) Take by mouth      Omega-3 Fatty Acids (FISH OIL) 1,000 mg Take 1,000 mg by mouth daily      tamoxifen (NOLVADEX) 20 mg tablet Take 20 mg by mouth daily       No current facility-administered medications for this visit        No Known Allergies    Objective   Vitals: Blood pressure 130/80, pulse 76, temperature 98 2 °F (36 8 °C), height 5' 8 11" (1 73 m), weight 104 kg (229 lb 3 2 oz)  Invasive Devices     None                 Physical Exam  Vitals signs reviewed  Constitutional:       Appearance: Normal appearance  She is well-developed  Comments: No moon faces  HENT:      Head: Normocephalic and atraumatic  Eyes:      Extraocular Movements: Extraocular movements intact  Conjunctiva/sclera: Conjunctivae normal       Comments: No lid lag, stare, proptosis, or periorbital edema  Neck:      Musculoskeletal: Normal range of motion and neck supple  Thyroid: No thyromegaly  Vascular: No carotid bruit  Comments: Thyroid normal in size without palpable nodules  No supraclavicular fat pad or buffalo hump  Thyroid gland without bruits  Cardiovascular:      Rate and Rhythm: Normal rate and regular rhythm  Heart sounds: Normal heart sounds  No murmur  Pulmonary:      Effort: Pulmonary effort is normal       Breath sounds: Normal breath sounds  No wheezing  Abdominal:      Palpations: Abdomen is soft  Musculoskeletal: Normal range of motion  General: No deformity  Right lower leg: No edema  Left lower leg: No edema  Comments: No tremor of the outstretched hands  Lymphadenopathy:      Cervical: No cervical adenopathy  Skin:     General: Skin is warm and dry  Findings: No rash  Comments: No violaceous striae  Neurological:      Mental Status: She is alert and oriented to person, place, and time  Deep Tendon Reflexes: Reflexes are normal and symmetric  Comments: Deep tendon reflexes normal          The history was obtained from the review of the chart and from the patient      Lab Results:    Blood work done on 07/10/2020 showed a CMP with a glucose of 96 fasting, sodium of 137, but was otherwise normal     Lab Results   Component Value Date    CREATININE 0 71 07/10/2020    CREATININE 0 72 03/21/2019    BUN 13 07/10/2020    K 4 2 07/10/2020     07/10/2020    CO2 28 07/10/2020       Lab Results   Component Value Date    ALT 7 07/10/2020    AST 12 07/10/2020    ALKPHOS 47 07/10/2020       Lab Results   Component Value Date    TSH 2 13 07/10/2020    FREET4 1 0 07/10/2020     Prolactin is 16 6  IGF-1 level is slightly low at 44      CBC is normal     Future Appointments   Date Time Provider Kash Thorpe   9/15/2021 10:00 AM David Bright MD [facility]

## 2020-09-09 NOTE — PATIENT INSTRUCTIONS
The blood work is all ok  We'll follow this over time  Blood work yearly  MRI in 2023, 5 years after last one  Follow up in 1 year with blood work

## 2021-09-15 ENCOUNTER — OFFICE VISIT (OUTPATIENT)
Dept: ENDOCRINOLOGY | Facility: HOSPITAL | Age: 51
End: 2021-09-15
Payer: COMMERCIAL

## 2021-09-15 VITALS
SYSTOLIC BLOOD PRESSURE: 124 MMHG | WEIGHT: 228.6 LBS | HEIGHT: 68 IN | BODY MASS INDEX: 34.65 KG/M2 | HEART RATE: 90 BPM | DIASTOLIC BLOOD PRESSURE: 90 MMHG

## 2021-09-15 DIAGNOSIS — E22.1 HYPERPROLACTINEMIA (HCC): ICD-10-CM

## 2021-09-15 DIAGNOSIS — D49.7 PITUITARY TUMOR: Primary | ICD-10-CM

## 2021-09-15 DIAGNOSIS — E23.6 PITUITARY CYST (HCC): ICD-10-CM

## 2021-09-15 DIAGNOSIS — R79.89 ELEVATED MORNING SERUM CORTISOL LEVEL: ICD-10-CM

## 2021-09-15 PROCEDURE — 99214 OFFICE O/P EST MOD 30 MIN: CPT | Performed by: INTERNAL MEDICINE

## 2021-09-15 RX ORDER — LETROZOLE 2.5 MG/1
2.5 TABLET, FILM COATED ORAL DAILY
COMMUNITY
Start: 2021-07-09

## 2021-09-15 NOTE — PATIENT INSTRUCTIONS
The am cortisol is slightly elevated  For completeness, we'll do a 24 hour urine for cortisol  The rest of the blood work is normal   Follow up in 1 year with blood work

## 2021-09-15 NOTE — PROGRESS NOTES
9/15/2021    Assessment/Plan      Diagnoses and all orders for this visit:    Pituitary tumor  -     Cortisol, Free, Urine, 24 Hour  -     Creatinine, urine, 24 hour- Lab Collect  -     Comprehensive metabolic panel Lab Collect; Future  -     CBC and differential Lab Collect; Future  -     Cortisol Level, AM Specimen Lab Collect; Future  -     Prolactin Lab Collect; Future  -     Insulin-like growth factor 1 (IGF-1) Lab Collect; Future  -     T4, free Lab Collect; Future  -     TSH, 3rd generation Lab Collect; Future  -     ACTH- Lab Collect; Future    Pituitary cyst ([place] [id number] )  -     Comprehensive metabolic panel Lab Collect; Future  -     CBC and differential Lab Collect; Future  -     Cortisol Level, AM Specimen Lab Collect; Future  -     Prolactin Lab Collect; Future  -     Insulin-like growth factor 1 (IGF-1) Lab Collect; Future  -     T4, free Lab Collect; Future  -     TSH, 3rd generation Lab Collect; Future  -     ACTH- Lab Collect; Future    Hyperprolactinemia ([place] [id number] )  -     Comprehensive metabolic panel Lab Collect; Future  -     CBC and differential Lab Collect; Future  -     Cortisol Level, AM Specimen Lab Collect; Future  -     Prolactin Lab Collect; Future  -     Insulin-like growth factor 1 (IGF-1) Lab Collect; Future  -     T4, free Lab Collect; Future  -     TSH, 3rd generation Lab Collect; Future  -     ACTH- Lab Collect; Future    Elevated morning serum cortisol level  -     Cortisol, Free, Urine, 24 Hour  -     Creatinine, urine, 24 hour- Lab Collect  -     Comprehensive metabolic panel Lab Collect; Future  -     CBC and differential Lab Collect; Future  -     Cortisol Level, AM Specimen Lab Collect; Future  -     Prolactin Lab Collect; Future  -     Insulin-like growth factor 1 (IGF-1) Lab Collect; Future  -     T4, free Lab Collect; Future  -     TSH, 3rd generation Lab Collect; Future  -     ACTH- Lab Collect; Future    Other orders  -     letrozole (FEMARA) 2 5 mg tablet;  Take 2 5 mg by mouth daily        Assessment/Plan:    1  pituitary tumor/ cyst    Her pituitary function is all normal except for a mildly elevated a m  cortisol  She does not need another MRI until 2023  2  Hyperprolactinemia  Prolactin level is now normal     3  Elevated a m  cortisol  She has just a mildly elevated a m  cortisol but is not cushingoid in appearance  I will for completeness do a 24 hour urine for urinary free cortisol and creatinine to rule out Cushing's disease  She will do a 24 hour urine for urinary free cortisol now  I have also scheduled her to follow up in 1 year with preceding fasting a m  ACTH level, cortisol, CMP, CBC, TSH, free T4, prolactin, and IGF-1 level  CC:  Prolactin excess and pituitary follow-up    History of Present Illness     HPI: Sebastien Duran is a 46y o  year old female with history of Mild hyperprolactinemia with pituitary tumor/cyst for follow-up visit  This problem was discovered in 2015 when she had irregular menses and was worked up noting an elevated prolactin  She did not need medical treatment for her hyperprolactinemia as it did normalize over time  Part of the workup demonstrated a 5 mm pituitary cyst/adenoma in the inferior aspect of the pituitary on MRI in 2018  She denies heat or cold intolerance but has a lot of hot flashes since her surgery  She does have some fatigue  She also admits to poor sleep with a lot of hot flashes  She has some dry skin but no brittle nails or hair loss  She denies palpitation, tremors, anxiety or depression, diarrhea or constipation  Weight is stable  She denies orthostatics symptoms or galactorrhea  She denies headache  She has no visual changes or loss of vision  Was using phentermine for weight loss and then tried metformin but not working and went off April 2021  Was getting menses irregularly and getting ovarian cyst  Had biopsy and possible precancerous lesion  Then D & C and no cancer noted on pathology  Then had ovaries and tubes removed in April 2021  Review of Systems   Constitutional: Positive for fatigue  Negative for unexpected weight change  HENT: Negative for trouble swallowing  Eyes: Positive for visual disturbance  No loss of vision  Feels some decrease in vision, need a glasses change  Respiratory: Negative for chest tightness and shortness of breath  Cardiovascular: Negative for chest pain and palpitations  Gastrointestinal: Negative for abdominal pain, constipation, diarrhea and nausea  Endocrine: Negative for cold intolerance and heat intolerance  Lots of hot flashes since the surgery  Genitourinary:        No menses  No galactorrhea  Skin: Negative for rash  Some dry skin  No brittle nails  No hair loss  Neurological: Negative for dizziness, tremors, light-headedness and headaches  No orthostatic symptoms  Psychiatric/Behavioral: Positive for sleep disturbance  Negative for dysphoric mood  The patient is not nervous/anxious  Sleeping poor and lots hot flashes         Historical Information   Past Medical History:   Diagnosis Date    Asthma     exercise induced    BRCA gene mutation negative 08/21/2019    [facility])    Breast cancer [facility])     left lumpectomy and XRT    History of prediabetes     Osteoarthritis     left knee     Past Surgical History:   Procedure Laterality Date    APPENDECTOMY      BREAST LUMPECTOMY Left 09/2019    with sentinal lymph nodes all negative    CYST REMOVAL Right     groin    DILATION AND CURETTAGE OF UTERUS  2021    HAND SURGERY Left     post trauma    KNEE ARTHROSCOPY Left     LAPAROSCOPIC SALPINGOOPHERECTOMY Bilateral 04/2021     Social History   Social History     Substance and Sexual Activity   Alcohol Use No     Social History     Substance and Sexual Activity   Drug Use No     Social History     Tobacco Use   Smoking Status Never Smoker   Smokeless Tobacco Never Used     Family History:   Family History   Problem Relation Age of Onset    Melanoma Mother 40    Hypertension Father     Breast cancer Sister 50    Hyperlipidemia Sister     No Known Problems Sister     No Known Problems Sister     Polycystic ovary syndrome Daughter     No Known Problems Son        Meds/Allergies   Current Outpatient Medications   Medication Sig Dispense Refill    ALBUTEROL SULFATE HFA IN Inhale prn      letrozole (FEMARA) 2 5 mg tablet Take 2 5 mg by mouth daily      Multiple Vitamins-Minerals (MULTIVITAMIN ADULT PO) Take by mouth      Omega-3 Fatty Acids (FISH OIL) 1,000 mg Take 1,000 mg by mouth daily      metFORMIN (GLUCOPHAGE-XR) 500 mg 24 hr tablet Take 500 mg by mouth daily with dinner (Patient not taking: Reported on 9/15/2021)       No current facility-administered medications for this visit  No Known Allergies    Objective   Vitals: Blood pressure 124/90, pulse 90, height 5' 8" (1 727 m), weight 104 kg (228 lb 9 6 oz)  Invasive Devices     None                 Physical Exam  Vitals reviewed  Constitutional:       Appearance: Normal appearance  She is well-developed  She is obese  HENT:      Head: Normocephalic and atraumatic  Comments: No moon faces  Eyes:      Extraocular Movements: Extraocular movements intact  Conjunctiva/sclera: Conjunctivae normal       Comments: No lid lag, stare, proptosis, or periorbital edema  Neck:      Thyroid: No thyromegaly  Vascular: No carotid bruit  Comments: Thyroid normal in size  No palpable thyroid nodules  No bruits over the thyroid gland  No supraclavicular fat pad or buffalo hump  Cardiovascular:      Rate and Rhythm: Normal rate and regular rhythm  Heart sounds: Normal heart sounds  No murmur heard  Pulmonary:      Effort: Pulmonary effort is normal       Breath sounds: Normal breath sounds  No wheezing  Abdominal:      Palpations: Abdomen is soft  Musculoskeletal:         General: No deformity   Normal range of motion  Cervical back: Normal range of motion and neck supple  Right lower leg: No edema  Left lower leg: No edema  Comments: No tremor of the outstretched hands  Lymphadenopathy:      Cervical: No cervical adenopathy  Skin:     General: Skin is warm and dry  Findings: No rash  Neurological:      Mental Status: She is alert and oriented to person, place, and time  Deep Tendon Reflexes: Reflexes are normal and symmetric  Comments: Deep tendon reflexes normal          The history was obtained from the review of the chart and from the patient  Lab Results:      Blood work done on 09/08/2021 showed an a m  cortisol 24 2 with normals up to 22  Prolactin is 11  IGF-1 level is 96  TSH is 2 03 with a free T4 of 1 1        CMP showed a glucose of 101 fasting but was otherwise normal       CBC is normal     Lab Results   Component Value Date    CREATININE 0 71 07/10/2020    CREATININE 0 72 03/21/2019    BUN 13 07/10/2020    K 4 2 07/10/2020     07/10/2020    CO2 28 07/10/2020       Lab Results   Component Value Date    ALT 7 07/10/2020    AST 12 07/10/2020    ALKPHOS 47 07/10/2020       Lab Results   Component Value Date    TSH 2 13 07/10/2020    FREET4 1 0 07/10/2020             Future Appointments   Date Time Provider Kash Thorpe   9/21/2022 10:00 AM Dmitri Hennessy MD [facility]

## 2021-10-14 LAB
CORTIS F 24H UR-MRATE: 13.7 MCG/24 H (ref 4–50)
CREAT 24H UR-MRATE: 1.07 G/24 H (ref 0.5–2.15)
SPECIMEN VOL 24H UR: 1700 ML

## 2022-08-16 DIAGNOSIS — E22.1 HYPERPROLACTINEMIA (HCC): ICD-10-CM

## 2022-08-16 DIAGNOSIS — D49.7 PITUITARY TUMOR: Primary | ICD-10-CM

## 2022-08-16 DIAGNOSIS — R79.89 ELEVATED MORNING SERUM CORTISOL LEVEL: ICD-10-CM

## 2022-12-07 LAB
ALBUMIN SERPL-MCNC: 3.9 G/DL (ref 3.6–5.1)
ALBUMIN/GLOB SERPL: 1.3 (CALC) (ref 1–2.5)
ALP SERPL-CCNC: 96 U/L (ref 37–153)
ALT SERPL-CCNC: 15 U/L (ref 6–29)
AST SERPL-CCNC: 13 U/L (ref 10–35)
BASOPHILS # BLD AUTO: 28 CELLS/UL (ref 0–200)
BASOPHILS NFR BLD AUTO: 0.3 %
BILIRUB SERPL-MCNC: 0.4 MG/DL (ref 0.2–1.2)
BUN SERPL-MCNC: 14 MG/DL (ref 7–25)
BUN/CREAT SERPL: NORMAL (CALC) (ref 6–22)
CALCIUM SERPL-MCNC: 9.3 MG/DL (ref 8.6–10.4)
CHLORIDE SERPL-SCNC: 103 MMOL/L (ref 98–110)
CO2 SERPL-SCNC: 30 MMOL/L (ref 20–32)
CORTIS AM PEAK SERPL-MCNC: 10.6 MCG/DL
CREAT SERPL-MCNC: 0.68 MG/DL (ref 0.5–1.03)
EOSINOPHIL # BLD AUTO: 230 CELLS/UL (ref 15–500)
EOSINOPHIL NFR BLD AUTO: 2.5 %
ERYTHROCYTE [DISTWIDTH] IN BLOOD BY AUTOMATED COUNT: 12.3 % (ref 11–15)
GFR/BSA.PRED SERPLBLD CYS-BASED-ARV: 105 ML/MIN/1.73M2
GLOBULIN SER CALC-MCNC: 2.9 G/DL (CALC) (ref 1.9–3.7)
GLUCOSE SERPL-MCNC: 99 MG/DL (ref 65–99)
HCT VFR BLD AUTO: 38.1 % (ref 35–45)
HGB BLD-MCNC: 12.5 G/DL (ref 11.7–15.5)
LYMPHOCYTES # BLD AUTO: 2364 CELLS/UL (ref 850–3900)
LYMPHOCYTES NFR BLD AUTO: 25.7 %
MCH RBC QN AUTO: 27.1 PG (ref 27–33)
MCHC RBC AUTO-ENTMCNC: 32.8 G/DL (ref 32–36)
MCV RBC AUTO: 82.5 FL (ref 80–100)
MONOCYTES # BLD AUTO: 589 CELLS/UL (ref 200–950)
MONOCYTES NFR BLD AUTO: 6.4 %
NEUTROPHILS # BLD AUTO: 5989 CELLS/UL (ref 1500–7800)
NEUTROPHILS NFR BLD AUTO: 65.1 %
PLATELET # BLD AUTO: 385 THOUSAND/UL (ref 140–400)
PMV BLD REES-ECKER: 11.1 FL (ref 7.5–12.5)
POTASSIUM SERPL-SCNC: 4.2 MMOL/L (ref 3.5–5.3)
PROLACTIN SERPL-MCNC: 9.3 NG/ML
PROT SERPL-MCNC: 6.8 G/DL (ref 6.1–8.1)
RBC # BLD AUTO: 4.62 MILLION/UL (ref 3.8–5.1)
SODIUM SERPL-SCNC: 140 MMOL/L (ref 135–146)
T4 FREE SERPL-MCNC: 1 NG/DL (ref 0.8–1.8)
TSH SERPL-ACNC: 1.84 MIU/L
WBC # BLD AUTO: 9.2 THOUSAND/UL (ref 3.8–10.8)

## 2022-12-13 LAB — CREAT 24H UR-MRATE: 1.19 G/24 H (ref 0.5–2.15)

## 2022-12-14 ENCOUNTER — OFFICE VISIT (OUTPATIENT)
Dept: ENDOCRINOLOGY | Facility: HOSPITAL | Age: 52
End: 2022-12-14

## 2022-12-14 VITALS
DIASTOLIC BLOOD PRESSURE: 82 MMHG | HEART RATE: 83 BPM | HEIGHT: 68 IN | WEIGHT: 244 LBS | BODY MASS INDEX: 36.98 KG/M2 | SYSTOLIC BLOOD PRESSURE: 122 MMHG

## 2022-12-14 DIAGNOSIS — D49.7 PITUITARY TUMOR: Primary | ICD-10-CM

## 2022-12-14 DIAGNOSIS — R79.89 ELEVATED MORNING SERUM CORTISOL LEVEL: ICD-10-CM

## 2022-12-14 DIAGNOSIS — E22.1 HYPERPROLACTINEMIA (HCC): ICD-10-CM

## 2022-12-14 DIAGNOSIS — E23.6 PITUITARY CYST (HCC): ICD-10-CM

## 2022-12-14 LAB
ACTH PLAS-MCNC: 23 PG/ML (ref 6–50)
ALBUMIN SERPL-MCNC: 3.9 G/DL (ref 3.6–5.1)
ALBUMIN/GLOB SERPL: 1.3 (CALC) (ref 1–2.5)
ALP SERPL-CCNC: 96 U/L (ref 37–153)
ALT SERPL-CCNC: 15 U/L (ref 6–29)
AST SERPL-CCNC: 13 U/L (ref 10–35)
BASOPHILS # BLD AUTO: 28 CELLS/UL (ref 0–200)
BASOPHILS NFR BLD AUTO: 0.3 %
BILIRUB SERPL-MCNC: 0.4 MG/DL (ref 0.2–1.2)
BUN SERPL-MCNC: 14 MG/DL (ref 7–25)
BUN/CREAT SERPL: NORMAL (CALC) (ref 6–22)
CALCIUM SERPL-MCNC: 9.3 MG/DL (ref 8.6–10.4)
CHLORIDE SERPL-SCNC: 103 MMOL/L (ref 98–110)
CO2 SERPL-SCNC: 30 MMOL/L (ref 20–32)
CORTIS AM PEAK SERPL-MCNC: 10.6 MCG/DL
CREAT SERPL-MCNC: 0.68 MG/DL (ref 0.5–1.03)
EOSINOPHIL # BLD AUTO: 230 CELLS/UL (ref 15–500)
EOSINOPHIL NFR BLD AUTO: 2.5 %
ERYTHROCYTE [DISTWIDTH] IN BLOOD BY AUTOMATED COUNT: 12.3 % (ref 11–15)
GFR/BSA.PRED SERPLBLD CYS-BASED-ARV: 105 ML/MIN/1.73M2
GLOBULIN SER CALC-MCNC: 2.9 G/DL (CALC) (ref 1.9–3.7)
GLUCOSE SERPL-MCNC: 99 MG/DL (ref 65–99)
HCT VFR BLD AUTO: 38.1 % (ref 35–45)
HGB BLD-MCNC: 12.5 G/DL (ref 11.7–15.5)
IGF-I SERPL-MCNC: 90 NG/ML (ref 50–317)
IGF-I Z-SCORE SERPL: -0.9 SD
LYMPHOCYTES # BLD AUTO: 2364 CELLS/UL (ref 850–3900)
LYMPHOCYTES NFR BLD AUTO: 25.7 %
MCH RBC QN AUTO: 27.1 PG (ref 27–33)
MCHC RBC AUTO-ENTMCNC: 32.8 G/DL (ref 32–36)
MCV RBC AUTO: 82.5 FL (ref 80–100)
MONOCYTES # BLD AUTO: 589 CELLS/UL (ref 200–950)
MONOCYTES NFR BLD AUTO: 6.4 %
NEUTROPHILS # BLD AUTO: 5989 CELLS/UL (ref 1500–7800)
NEUTROPHILS NFR BLD AUTO: 65.1 %
PLATELET # BLD AUTO: 385 THOUSAND/UL (ref 140–400)
PMV BLD REES-ECKER: 11.1 FL (ref 7.5–12.5)
POTASSIUM SERPL-SCNC: 4.2 MMOL/L (ref 3.5–5.3)
PROLACTIN SERPL-MCNC: 9.3 NG/ML
PROT SERPL-MCNC: 6.8 G/DL (ref 6.1–8.1)
RBC # BLD AUTO: 4.62 MILLION/UL (ref 3.8–5.1)
SODIUM SERPL-SCNC: 140 MMOL/L (ref 135–146)
T4 FREE SERPL-MCNC: 1 NG/DL (ref 0.8–1.8)
TSH SERPL-ACNC: 1.84 MIU/L
WBC # BLD AUTO: 9.2 THOUSAND/UL (ref 3.8–10.8)

## 2022-12-14 RX ORDER — MELATONIN
2000 DAILY
COMMUNITY

## 2022-12-14 RX ORDER — ANASTROZOLE 1 MG/1
1 TABLET ORAL DAILY
COMMUNITY

## 2022-12-14 NOTE — PATIENT INSTRUCTIONS
So far, the blood work is all good       Follow up in 1 year with fasting blood work and MRI of the pituitary

## 2022-12-14 NOTE — PROGRESS NOTES
12/17/2022    Assessment/Plan      Diagnoses and all orders for this visit:    Pituitary tumor  -     Comprehensive metabolic panel Lab Collect; Future  -     CBC and differential Lab Collect; Future  -     Insulin-like growth factor 1 (IGF-1) Lab Collect; Future  -     Prolactin Lab Collect; Future  -     T4, free Lab Collect; Future  -     TSH, 3rd generation Lab Collect; Future  -     Cortisol Level, AM Specimen Lab Collect; Future  -     ACTH- Lab Collect; Future  -     MRI brain pituitary wo and w contrast; Future    Pituitary cyst ([place] [id number] )  -     Comprehensive metabolic panel Lab Collect; Future  -     CBC and differential Lab Collect; Future  -     Insulin-like growth factor 1 (IGF-1) Lab Collect; Future  -     Prolactin Lab Collect; Future  -     T4, free Lab Collect; Future  -     TSH, 3rd generation Lab Collect; Future  -     Cortisol Level, AM Specimen Lab Collect; Future  -     ACTH- Lab Collect; Future  -     MRI brain pituitary wo and w contrast; Future    Hyperprolactinemia ([facility] )  -     Comprehensive metabolic panel Lab Collect; Future  -     CBC and differential Lab Collect; Future  -     Insulin-like growth factor 1 (IGF-1) Lab Collect; Future  -     Prolactin Lab Collect; Future  -     T4, free Lab Collect; Future  -     TSH, 3rd generation Lab Collect; Future  -     Cortisol Level, AM Specimen Lab Collect; Future  -     ACTH- Lab Collect; Future  -     MRI brain pituitary wo and w contrast; Future    Elevated morning serum cortisol level  -     Comprehensive metabolic panel Lab Collect; Future  -     CBC and differential Lab Collect; Future  -     Insulin-like growth factor 1 (IGF-1) Lab Collect; Future  -     Prolactin Lab Collect; Future  -     T4, free Lab Collect; Future  -     TSH, 3rd generation Lab Collect; Future  -     Cortisol Level, AM Specimen Lab Collect; Future  -     ACTH- Lab Collect; Future    Other orders  -     anastrozole (ARIMIDEX) 1 mg tablet;  Take 1 mg by mouth daily  - cholecalciferol (VITAMIN D3) 1,000 units tablet; Take 2,000 Units by mouth daily        Assessment/Plan:  1  Hyperprolactinemia  Prolactin was normal   At this point her prolactin level is no longer elevated  Will be followed over time  2   Pituitary tumor and cyst   Her pituitary function studies have all been normal at this point other than the mildly elevated a m  cortisol in the past   I will continue to follow her pituitary function on a yearly basis       3   Elevated a m  serum cortisol  Her 24-hour urine cortisol has been normal   Her most recent a m  cortisol is normal     I have asked her to follow-up in 1 year with preceding ACTH, a m  cortisol, TSH, free T4, prolactin, IGF-I level, CBC, and CMP  CC: Prolactin, pituitary, cortisol follow-up    History of Present Illness     HPI: Aleks Novak is a 46y o  year old female with history of mild hyperprolactinemia with pituitary tumor/cyst and elevated a m  cortisol which is mild for follow-up visit  This problem was discovered in 2015 when she had irregular menses and was worked up noting an elevated prolactin  She did not need medical treatment for her hyperprolactinemia as it did normalize over time  Part of the workup demonstrated a 5 mm pituitary cyst/adenoma in the inferior aspect of the pituitary on MRI in 2018  She had a mildly elevated a m  cortisol 24 2  24-hour urine free cortisol was normal     She was using phentermine for weight loss and tried metformin but this was not working and went off it in April 2021  Vitamin D was low and just started vitamin D 2000 units daily  Review of Systems   Constitutional: Positive for fatigue  Negative for unexpected weight change  16 lbs increase since sept 2021  HENT: Negative for trouble swallowing  Eyes: Negative for visual disturbance  Has dry eyes and working with eye doctor  Wears glasses  Nod diplopia      Respiratory: Negative for chest tightness and shortness of breath  Cardiovascular: Negative for chest pain and palpitations  Gastrointestinal: Negative for abdominal pain, constipation, diarrhea and nausea  Endocrine: Negative for cold intolerance, heat intolerance, polydipsia, polyphagia and polyuria  Some hot flashes at times, worse at night  Nocturia 2 times a night  Genitourinary:        Postmenopausal and had ovaries removed  No galactorrhea  Skin: Negative for wound  Has unchanged dry skin  No brittle nails  Has hair loss unchanged  Hirsutism on chin unchanged  Neurological: Negative for dizziness, tremors, weakness, light-headedness and headaches  Feels generally not as strong as in the past     Psychiatric/Behavioral: Positive for sleep disturbance  Insomnia, toss and turn all night, going to get sleep study          Historical Information   Past Medical History:   Diagnosis Date   • Asthma     exercise induced   • BRCA gene mutation negative 08/21/2019    [facility])   • Breast cancer [facility])     left lumpectomy and XRT   • History of prediabetes    • Osteoarthritis     left knee     Past Surgical History:   Procedure Laterality Date   • APPENDECTOMY     • BREAST LUMPECTOMY Left 09/2019    with sentinal lymph nodes all negative   • CYST REMOVAL Right     groin   • DILATION AND CURETTAGE OF UTERUS  2021   • HAND SURGERY Left     post trauma   • KNEE ARTHROSCOPY Left    • LAPAROSCOPIC SALPINGOOPHERECTOMY Bilateral 04/2021     Social History   Social History     Substance and Sexual Activity   Alcohol Use No     Social History     Substance and Sexual Activity   Drug Use No     Social History     Tobacco Use   Smoking Status Never   Smokeless Tobacco Never     Family History:   Family History   Problem Relation Age of Onset   • Melanoma Mother 40   • Hypertension Father    • Breast cancer Sister 50   • Hyperlipidemia Sister    • No Known Problems Sister    • No Known Problems Sister    • Polycystic ovary syndrome Daughter    • No Known Problems Son        Meds/Allergies   Current Outpatient Medications   Medication Sig Dispense Refill   • anastrozole (ARIMIDEX) 1 mg tablet Take 1 mg by mouth daily     • cholecalciferol (VITAMIN D3) 1,000 units tablet Take 2,000 Units by mouth daily     • Multiple Vitamins-Minerals (MULTIVITAMIN ADULT PO) Take by mouth     • Omega-3 Fatty Acids (FISH OIL) 1,000 mg Take 1,000 mg by mouth daily       No current facility-administered medications for this visit  No Known Allergies    Objective   Vitals: Blood pressure 122/82, pulse 83, height 5' 8" (1 727 m), weight 111 kg (244 lb)  Invasive Devices     None                 Physical Exam  Vitals reviewed  Constitutional:       Appearance: Normal appearance  She is well-developed  She is obese  HENT:      Head: Normocephalic and atraumatic  Eyes:      Extraocular Movements: Extraocular movements intact  Conjunctiva/sclera: Conjunctivae normal       Comments: No lid lag, stare, proptosis, or periorbital edema  Neck:      Thyroid: No thyromegaly  Vascular: No carotid bruit  Comments: Thyroid normal in size  No palpable thyroid nodules  No supraclavicular fat pad or buffalo hump  Cardiovascular:      Rate and Rhythm: Normal rate and regular rhythm  Heart sounds: Normal heart sounds  No murmur heard  Pulmonary:      Effort: Pulmonary effort is normal       Breath sounds: Normal breath sounds  No wheezing  Abdominal:      Palpations: Abdomen is soft  Musculoskeletal:         General: No deformity  Normal range of motion  Cervical back: Normal range of motion and neck supple  Right lower leg: No edema  Left lower leg: No edema  Comments: Tremor of the outstretched hands  No spinous process tenderness  No CVA tenderness  Lymphadenopathy:      Cervical: No cervical adenopathy  Skin:     General: Skin is warm and dry  Findings: No rash  Comments: No violaceous striae  Neurological:      Mental Status: She is alert and oriented to person, place, and time  Deep Tendon Reflexes: Reflexes are normal and symmetric  Comments: Patellar deep tendon reflexes normal          The history was obtained from the review of the chart and from the patient  Lab Results:      Blood work done on 12/7/2022 showed a CMP with a sodium of 140 and a glucose of 99 but was otherwise normal     A m  cortisol is 10 6  ACTH level is 23  TSH is 1 84 with a free T4 of 1  Prolactin is 9 3  CBC is normal     IGF-I level is 90  Lab Results   Component Value Date    CREATININE 0 68 12/07/2022    CREATININE 0 71 07/10/2020    CREATININE 0 72 03/21/2019    BUN 14 12/07/2022    K 4 2 12/07/2022     12/07/2022    CO2 30 12/07/2022     eGFR   Date Value Ref Range Status   12/07/2022 105 > OR = 60 mL/min/1 73m2 Final     Comment:     The eGFR is based on the CKD-EPI 2021 equation  To calculate   the new eGFR from a previous Creatinine or Cystatin C  result, go to Mike at  [email or web address]  [email or web address]           Lab Results   Component Value Date    ALT 15 12/07/2022    AST 13 12/07/2022    ALKPHOS 96 12/07/2022       Lab Results   Component Value Date    TSH 1 84 12/07/2022    FREET4 1 0 12/07/2022             Future Appointments   Date Time Provider Kash Thorpe   12/6/2023  8:40 AM Chioma Herrera MD [facility]

## 2023-05-03 ENCOUNTER — TELEPHONE (OUTPATIENT)
Dept: ENDOCRINOLOGY | Facility: HOSPITAL | Age: 53
End: 2023-05-03

## 2023-05-03 DIAGNOSIS — D49.7 PITUITARY TUMOR: Primary | ICD-10-CM

## 2023-05-03 DIAGNOSIS — R79.89 ELEVATED MORNING SERUM CORTISOL LEVEL: ICD-10-CM

## 2023-05-03 DIAGNOSIS — E22.1 HYPERPROLACTINEMIA (HCC): ICD-10-CM

## 2023-05-03 NOTE — TELEPHONE ENCOUNTER
Received voicemail from patient  She has a high deductible insurance plan which she has met a this time and the plan renews in July  She would like to know if she can have the MRI in June to be more cost effective?

## 2023-12-01 LAB
ACTH PLAS-MCNC: 32 PG/ML (ref 6–50)
ALBUMIN SERPL-MCNC: 4.1 G/DL (ref 3.6–5.1)
ALBUMIN SERPL-MCNC: 4.1 G/DL (ref 3.6–5.1)
ALBUMIN/GLOB SERPL: 1.4 (CALC) (ref 1–2.5)
ALBUMIN/GLOB SERPL: 1.4 (CALC) (ref 1–2.5)
ALP SERPL-CCNC: 96 U/L (ref 37–153)
ALP SERPL-CCNC: 96 U/L (ref 37–153)
ALT SERPL-CCNC: 17 U/L (ref 6–29)
ALT SERPL-CCNC: 17 U/L (ref 6–29)
AST SERPL-CCNC: 17 U/L (ref 10–35)
AST SERPL-CCNC: 17 U/L (ref 10–35)
BASOPHILS # BLD AUTO: 43 CELLS/UL (ref 0–200)
BASOPHILS # BLD AUTO: 43 CELLS/UL (ref 0–200)
BASOPHILS NFR BLD AUTO: 0.6 %
BASOPHILS NFR BLD AUTO: 0.6 %
BILIRUB SERPL-MCNC: 0.5 MG/DL (ref 0.2–1.2)
BILIRUB SERPL-MCNC: 0.5 MG/DL (ref 0.2–1.2)
BUN SERPL-MCNC: 12 MG/DL (ref 7–25)
BUN SERPL-MCNC: 12 MG/DL (ref 7–25)
BUN/CREAT SERPL: NORMAL (CALC) (ref 6–22)
BUN/CREAT SERPL: NORMAL (CALC) (ref 6–22)
CALCIUM SERPL-MCNC: 9.7 MG/DL (ref 8.6–10.4)
CALCIUM SERPL-MCNC: 9.7 MG/DL (ref 8.6–10.4)
CHLORIDE SERPL-SCNC: 104 MMOL/L (ref 98–110)
CHLORIDE SERPL-SCNC: 104 MMOL/L (ref 98–110)
CO2 SERPL-SCNC: 31 MMOL/L (ref 20–32)
CO2 SERPL-SCNC: 31 MMOL/L (ref 20–32)
CORTIS AM PEAK SERPL-MCNC: 13.2 MCG/DL
CORTIS AM PEAK SERPL-MCNC: 13.2 MCG/DL
CREAT SERPL-MCNC: 0.73 MG/DL (ref 0.5–1.03)
CREAT SERPL-MCNC: 0.73 MG/DL (ref 0.5–1.03)
EOSINOPHIL # BLD AUTO: 209 CELLS/UL (ref 15–500)
EOSINOPHIL # BLD AUTO: 209 CELLS/UL (ref 15–500)
EOSINOPHIL NFR BLD AUTO: 2.9 %
EOSINOPHIL NFR BLD AUTO: 2.9 %
ERYTHROCYTE [DISTWIDTH] IN BLOOD BY AUTOMATED COUNT: 12.7 % (ref 11–15)
ERYTHROCYTE [DISTWIDTH] IN BLOOD BY AUTOMATED COUNT: 12.7 % (ref 11–15)
GFR/BSA.PRED SERPLBLD CYS-BASED-ARV: 98 ML/MIN/1.73M2
GFR/BSA.PRED SERPLBLD CYS-BASED-ARV: 98 ML/MIN/1.73M2
GLOBULIN SER CALC-MCNC: 2.9 G/DL (CALC) (ref 1.9–3.7)
GLOBULIN SER CALC-MCNC: 2.9 G/DL (CALC) (ref 1.9–3.7)
GLUCOSE SERPL-MCNC: 98 MG/DL (ref 65–99)
GLUCOSE SERPL-MCNC: 98 MG/DL (ref 65–99)
HCT VFR BLD AUTO: 38.9 % (ref 35–45)
HCT VFR BLD AUTO: 38.9 % (ref 35–45)
HGB BLD-MCNC: 12.9 G/DL (ref 11.7–15.5)
HGB BLD-MCNC: 12.9 G/DL (ref 11.7–15.5)
IGF-I SERPL-MCNC: 114 NG/ML (ref 50–317)
IGF-I SERPL-MCNC: 114 NG/ML (ref 50–317)
IGF-I Z-SCORE SERPL: -0.4 SD
IGF-I Z-SCORE SERPL: -0.4 SD
LYMPHOCYTES # BLD AUTO: 2038 CELLS/UL (ref 850–3900)
LYMPHOCYTES # BLD AUTO: 2038 CELLS/UL (ref 850–3900)
LYMPHOCYTES NFR BLD AUTO: 28.3 %
LYMPHOCYTES NFR BLD AUTO: 28.3 %
MCH RBC QN AUTO: 27.4 PG (ref 27–33)
MCH RBC QN AUTO: 27.4 PG (ref 27–33)
MCHC RBC AUTO-ENTMCNC: 33.2 G/DL (ref 32–36)
MCHC RBC AUTO-ENTMCNC: 33.2 G/DL (ref 32–36)
MCV RBC AUTO: 82.6 FL (ref 80–100)
MCV RBC AUTO: 82.6 FL (ref 80–100)
MONOCYTES # BLD AUTO: 482 CELLS/UL (ref 200–950)
MONOCYTES # BLD AUTO: 482 CELLS/UL (ref 200–950)
MONOCYTES NFR BLD AUTO: 6.7 %
MONOCYTES NFR BLD AUTO: 6.7 %
NEUTROPHILS # BLD AUTO: 4428 CELLS/UL (ref 1500–7800)
NEUTROPHILS # BLD AUTO: 4428 CELLS/UL (ref 1500–7800)
NEUTROPHILS NFR BLD AUTO: 61.5 %
NEUTROPHILS NFR BLD AUTO: 61.5 %
PLATELET # BLD AUTO: 347 THOUSAND/UL (ref 140–400)
PLATELET # BLD AUTO: 347 THOUSAND/UL (ref 140–400)
PMV BLD REES-ECKER: 10.9 FL (ref 7.5–12.5)
PMV BLD REES-ECKER: 10.9 FL (ref 7.5–12.5)
POTASSIUM SERPL-SCNC: 4.6 MMOL/L (ref 3.5–5.3)
POTASSIUM SERPL-SCNC: 4.6 MMOL/L (ref 3.5–5.3)
PROLACTIN SERPL-MCNC: 9.8 NG/ML
PROLACTIN SERPL-MCNC: 9.8 NG/ML
PROT SERPL-MCNC: 7 G/DL (ref 6.1–8.1)
PROT SERPL-MCNC: 7 G/DL (ref 6.1–8.1)
RBC # BLD AUTO: 4.71 MILLION/UL (ref 3.8–5.1)
RBC # BLD AUTO: 4.71 MILLION/UL (ref 3.8–5.1)
SODIUM SERPL-SCNC: 141 MMOL/L (ref 135–146)
SODIUM SERPL-SCNC: 141 MMOL/L (ref 135–146)
T4 FREE SERPL-MCNC: 1 NG/DL (ref 0.8–1.8)
T4 FREE SERPL-MCNC: 1 NG/DL (ref 0.8–1.8)
TSH SERPL-ACNC: 1.87 MIU/L
TSH SERPL-ACNC: 1.87 MIU/L
WBC # BLD AUTO: 7.2 THOUSAND/UL (ref 3.8–10.8)
WBC # BLD AUTO: 7.2 THOUSAND/UL (ref 3.8–10.8)

## 2023-12-06 ENCOUNTER — OFFICE VISIT (OUTPATIENT)
Dept: ENDOCRINOLOGY | Facility: HOSPITAL | Age: 53
End: 2023-12-06
Payer: COMMERCIAL

## 2023-12-06 VITALS
SYSTOLIC BLOOD PRESSURE: 148 MMHG | WEIGHT: 246 LBS | HEART RATE: 83 BPM | OXYGEN SATURATION: 97 % | DIASTOLIC BLOOD PRESSURE: 90 MMHG | HEIGHT: 68 IN | BODY MASS INDEX: 37.28 KG/M2

## 2023-12-06 DIAGNOSIS — E22.1 HYPERPROLACTINEMIA (HCC): Primary | ICD-10-CM

## 2023-12-06 DIAGNOSIS — E23.6 PITUITARY CYST (HCC): ICD-10-CM

## 2023-12-06 DIAGNOSIS — R79.89 ELEVATED MORNING SERUM CORTISOL LEVEL: ICD-10-CM

## 2023-12-06 DIAGNOSIS — D49.7 PITUITARY TUMOR: ICD-10-CM

## 2023-12-06 PROCEDURE — 99214 OFFICE O/P EST MOD 30 MIN: CPT | Performed by: INTERNAL MEDICINE

## 2023-12-06 RX ORDER — EXEMESTANE 25 MG/1
25 TABLET ORAL DAILY
COMMUNITY
Start: 2023-09-18

## 2023-12-06 NOTE — ASSESSMENT & PLAN NOTE
She had an MRI demonstrating a 5 mm pituitary tumor that is unchanged in size from MRI in 2018. She has no hyper or hypofunction of the pituitary gland. Observation remains the treatment of choice and I will have her follow up on a regular basis.

## 2023-12-06 NOTE — PROGRESS NOTES
12/7/2023    Assessment/Plan     1. Hyperprolactinemia ([address])  Assessment & Plan:  Her prolactin level remains normal at this time. We will continue to follow it over time. Orders:  -     Comprehensive metabolic panel Lab Collect; Future; Expected date: 11/04/2024  -     CBC and differential Lab Collect; Future; Expected date: 11/04/2024  -     Cortisol Level,7-9 AM Specimen; Future; Expected date: 11/04/2024  -     Insulin-like growth factor 1 (IGF-1) Lab Collect; Future; Expected date: 11/04/2024  -     Prolactin Lab Collect; Future; Expected date: 11/04/2024  -     T4, free Lab Collect; Future; Expected date: 11/04/2024  -     TSH, 3rd generation Lab Collect; Future; Expected date: 11/04/2024  -     [address] [place] [address]; Future; Expected date: 11/04/2024    2. Pituitary cyst ([address])  -     Comprehensive metabolic panel Lab Collect; Future; Expected date: 11/04/2024  -     CBC and differential Lab Collect; Future; Expected date: 11/04/2024  -     Cortisol Level,7-9 AM Specimen; Future; Expected date: 11/04/2024  -     Insulin-like growth factor 1 (IGF-1) Lab Collect; Future; Expected date: 11/04/2024  -     Prolactin Lab Collect; Future; Expected date: 11/04/2024  -     T4, free Lab Collect; Future; Expected date: 11/04/2024  -     TSH, 3rd generation Lab Collect; Future; Expected date: 11/04/2024  -     [address] [place] [address]; Future; Expected date: 11/04/2024    3. Pituitary tumor  Assessment & Plan:  She had an MRI demonstrating a 5 mm pituitary tumor that is unchanged in size from MRI in 2018. She has no hyper or hypofunction of the pituitary gland. Observation remains the treatment of choice and I will have her follow up on a regular basis. Orders:  -     Comprehensive metabolic panel Lab Collect; Future; Expected date: 11/04/2024  -     CBC and differential Lab Collect; Future; Expected date: 11/04/2024  -     Cortisol Level,7-9 AM Specimen;  Future; Expected date: 11/04/2024  -     Insulin-like growth factor 1 (IGF-1) Lab Collect; Future; Expected date: 11/04/2024  -     Prolactin Lab Collect; Future; Expected date: 11/04/2024  -     T4, free Lab Collect; Future; Expected date: 11/04/2024  -     TSH, 3rd generation Lab Collect; Future; Expected date: 11/04/2024  -     [facility]; Future; Expected date: 11/04/2024    4. Elevated morning serum cortisol level  -     Comprehensive metabolic panel Lab Collect; Future; Expected date: 11/04/2024  -     CBC and differential Lab Collect; Future; Expected date: 11/04/2024  -     Cortisol Level,7-9 AM Specimen; Future; Expected date: 11/04/2024  -     Insulin-like growth factor 1 (IGF-1) Lab Collect; Future; Expected date: 11/04/2024  -     Prolactin Lab Collect; Future; Expected date: 11/04/2024  -     T4, free Lab Collect; Future; Expected date: 11/04/2024  -     TSH, 3rd generation Lab Collect; Future; Expected date: 11/04/2024  -     [address] [facility]; Future; Expected date: 11/04/2024       I have asked her to monitor her blood pressure at home and follow-up with her primary care physician if still elevated. I have asked her to follow up in 1 year with preceding ACTH level, cortisol level in the morning between 7 and 9 AM, CBC, CMP, IGF-1 level, prolactin, TSH, and free T4.    CC: Hyperprolactinemia, pituitary tumor, cortisol follow-up    HPI: Marin Coulter is a 69-year-old female with history of mild hyperprolactinemia with pituitary tumor/cyst and elevated AM cortisol that is quite mild for follow-up visit. This problem was discovered in 2015 when she had irregular menses and was worked up noting an elevated prolactin. She did not need medical treatment for her hyperprolactinemia as it did normalize over time. Part of the work-up demonstrated a 5 mm pituitary cyst/adenoma in the inferior aspect of the pituitary on an MRI in 2018.  She had a mildly elevated AM cortisol at 24.2 with a 24-hour urinary free cortisol that was normal. She is here for yearly follow-up. She is currently taking exemestane 25 mg a day starting 2020. She underwent surgery in 02/2019 and this marks her final year on the treatment. She is still taking vitamin D 2000, multivitamin, and fish oil. She is supposed to be taking calcium, but she has not taken it yet. She denies any leaking from the breast tissue or the nipple, headaches, lightheadedness or dizziness when standing up, palpitations, chest pain, or dyspnea, diarrhea or constipation, abdominal pain, brittle nails, any tremors or shaky hands, anxiety or depression, visual issues or loss of vision. She goes to the eye doctor once a year because she wears contacts. Her experience of hot flashes is improving, and she is not constantly feeling excessively feeling cold. She experiences dry skin in the winter. She has been losing hair for an extended period, but despite this, there is no visible thinning as she still has a substantial amount of hair. Her sleeping is not optimal as she frequently wakes up; however, using a sleep apnea machine appears to be providing some improvement. She gets some episodes of fatigue throughout the day, but it is not like she is exhausted all day long. She does not drink any caffeine. Her weight has remained relatively stable, with a minimal increase of about 2 pounds. She attempts to engage in regular exercise, but experiences discomfort in her knees whenever she initiates certain activities. However, when using rower, the impact on her knee is comparatively less. She has not thought about physical therapy for her knees yet. Review of Systems  The pertinent positive and negative findings are as noted in the HPI.     Historical Information   Past Medical History:   Diagnosis Date    Asthma     exercise induced    BRCA gene mutation negative 08/21/2019    [facility])    Breast cancer [facility])     left lumpectomy and XRT    History of prediabetes     Osteoarthritis     left knee     Past Surgical History:   Procedure Laterality Date    APPENDECTOMY      BREAST LUMPECTOMY Left 09/2019    with sentinal lymph nodes all negative    CYST REMOVAL Right     groin    DILATION AND CURETTAGE OF UTERUS  2021    HAND SURGERY Left     post trauma    KNEE ARTHROSCOPY Left     LAPAROSCOPIC SALPINGOOPHERECTOMY Bilateral 04/2021     Social History   Social History     Substance and Sexual Activity   Alcohol Use No     Social History     Substance and Sexual Activity   Drug Use No     Social History     Tobacco Use   Smoking Status Never   Smokeless Tobacco Never     Family History:   Family History   Problem Relation Age of Onset    Melanoma Mother 40    Hypertension Father     Breast cancer Sister 50    Hyperlipidemia Sister     No Known Problems Sister     No Known Problems Sister     Polycystic ovary syndrome Daughter     No Known Problems Son        Meds/Allergies   Current Outpatient Medications   Medication Sig Dispense Refill    cholecalciferol (VITAMIN D3) 1,000 units tablet Take 2,000 Units by mouth daily      exemestane (AROMASIN) 25 MG tablet Take 25 mg by mouth daily      Multiple Vitamins-Minerals (MULTIVITAMIN ADULT PO) Take by mouth      Omega-3 Fatty Acids (FISH OIL) 1,000 mg Take 1,000 mg by mouth daily       No current facility-administered medications for this visit. No Known Allergies    Objective   Vitals: Blood pressure 148/90, pulse 83, height 5' 8" (1.727 m), weight 112 kg (246 lb), SpO2 97 %. Invasive Devices       None                   Physical Exam  Physical exam normal with pertinent positives and negatives. Eyes: No lid lag, stare, proptosis, or periorbital edema. No moon facies. Neck: Thyroid normal in size. No nodules. No supraclavicular fat pads or buffalo hump. Respiratory: Lungs clear. Cardiovascular: Heart regular. No murmurs. Neurological: No tremor of the outstretched hands.  Patellar deep tendon reflexes normal.     Vital Signs  Repeat blood pressure in the right arm 150/90. The history was obtained from the review of the chart and from the patient. Lab Results:          Lab Results   Component Value Date    CREATININE 0.73 11/28/2023    CREATININE 0.68 12/07/2022    CREATININE 0.71 07/10/2020    BUN 12 11/28/2023    K 4.6 11/28/2023     11/28/2023    CO2 31 11/28/2023     eGFR   Date Value Ref Range Status   11/28/2023 98 > OR = 60 mL/min/1.73m2 Final         Lab Results   Component Value Date    ALT 17 11/28/2023    AST 17 11/28/2023    ALKPHOS 96 11/28/2023       Lab Results   Component Value Date    TSH 1.87 11/28/2023    FREET4 1.0 11/28/2023     Blood work performed on 11/28/2023 showed an AM cortisol of 13.2 with an ACTH level of 32. TSH is 1.87 with a free T4 of 1. Prolactin is 9.8. IGF-I level is 114. CBC is normal.     CMP showed glucose of 98 fasting with sodium of 130 but was otherwise normal.    Future Appointments   Date Time Provider [facility] [address]   12/11/2024  8:40 AM Edgar Marquez MD ENDO [facility]       Transcribed for Edgar Marquez MD, by Brennon Segovia on 12/07/23 at 5:12 PM. Powered by [employer].

## 2023-12-06 NOTE — PATIENT INSTRUCTIONS
Blood work is all normal.     No pituitary under function or over function. The Mri shows the same size small 5 mm pituitary tumor. Continue to follow over time. Follow up in 1 year with blood work.

## 2025-01-09 LAB
ALBUMIN SERPL-MCNC: 4.2 G/DL (ref 3.6–5.1)
ALBUMIN/GLOB SERPL: 1.4 (CALC) (ref 1–2.5)
ALP SERPL-CCNC: 85 U/L (ref 37–153)
ALT SERPL-CCNC: 11 U/L (ref 6–29)
AST SERPL-CCNC: 14 U/L (ref 10–35)
BASOPHILS # BLD AUTO: 34 CELLS/UL (ref 0–200)
BASOPHILS NFR BLD AUTO: 0.4 %
BILIRUB SERPL-MCNC: 0.5 MG/DL (ref 0.2–1.2)
BUN SERPL-MCNC: 13 MG/DL (ref 7–25)
BUN/CREAT SERPL: NORMAL (CALC) (ref 6–22)
CALCIUM SERPL-MCNC: 9.7 MG/DL (ref 8.6–10.4)
CHLORIDE SERPL-SCNC: 103 MMOL/L (ref 98–110)
CO2 SERPL-SCNC: 31 MMOL/L (ref 20–32)
CORTIS AM PEAK SERPL-MCNC: 17.2 MCG/DL
CREAT SERPL-MCNC: 0.82 MG/DL (ref 0.5–1.03)
EOSINOPHIL # BLD AUTO: 193 CELLS/UL (ref 15–500)
EOSINOPHIL NFR BLD AUTO: 2.3 %
ERYTHROCYTE [DISTWIDTH] IN BLOOD BY AUTOMATED COUNT: 12.5 % (ref 11–15)
GFR/BSA.PRED SERPLBLD CYS-BASED-ARV: 85 ML/MIN/1.73M2
GLOBULIN SER CALC-MCNC: 3.1 G/DL (CALC) (ref 1.9–3.7)
GLUCOSE SERPL-MCNC: 97 MG/DL (ref 65–99)
HCT VFR BLD AUTO: 44.8 % (ref 35–45)
HGB BLD-MCNC: 14.1 G/DL (ref 11.7–15.5)
INSULIN SERPL-ACNC: 17.1 UIU/ML
LYMPHOCYTES # BLD AUTO: 2470 CELLS/UL (ref 850–3900)
LYMPHOCYTES NFR BLD AUTO: 29.4 %
MCH RBC QN AUTO: 27 PG (ref 27–33)
MCHC RBC AUTO-ENTMCNC: 31.5 G/DL (ref 32–36)
MCV RBC AUTO: 85.8 FL (ref 80–100)
MONOCYTES # BLD AUTO: 504 CELLS/UL (ref 200–950)
MONOCYTES NFR BLD AUTO: 6 %
NEUTROPHILS # BLD AUTO: 5200 CELLS/UL (ref 1500–7800)
NEUTROPHILS NFR BLD AUTO: 61.9 %
PLATELET # BLD AUTO: 356 THOUSAND/UL (ref 140–400)
PMV BLD REES-ECKER: 10.5 FL (ref 7.5–12.5)
POTASSIUM SERPL-SCNC: 4.4 MMOL/L (ref 3.5–5.3)
PROLACTIN SERPL-MCNC: 9.2 NG/ML
PROT SERPL-MCNC: 7.3 G/DL (ref 6.1–8.1)
RBC # BLD AUTO: 5.22 MILLION/UL (ref 3.8–5.1)
SODIUM SERPL-SCNC: 140 MMOL/L (ref 135–146)
T4 FREE SERPL-MCNC: 1 NG/DL (ref 0.8–1.8)
TSH SERPL-ACNC: 1.8 MIU/L
WBC # BLD AUTO: 8.4 THOUSAND/UL (ref 3.8–10.8)

## 2025-01-13 LAB
ACTH PLAS-MCNC: 31 PG/ML (ref 6–50)
ALBUMIN SERPL-MCNC: 4.2 G/DL (ref 3.6–5.1)
ALBUMIN/GLOB SERPL: 1.4 (CALC) (ref 1–2.5)
ALP SERPL-CCNC: 85 U/L (ref 37–153)
ALT SERPL-CCNC: 11 U/L (ref 6–29)
AST SERPL-CCNC: 14 U/L (ref 10–35)
BASOPHILS # BLD AUTO: 34 CELLS/UL (ref 0–200)
BASOPHILS NFR BLD AUTO: 0.4 %
BILIRUB SERPL-MCNC: 0.5 MG/DL (ref 0.2–1.2)
BUN SERPL-MCNC: 13 MG/DL (ref 7–25)
BUN/CREAT SERPL: NORMAL (CALC) (ref 6–22)
CALCIUM SERPL-MCNC: 9.7 MG/DL (ref 8.6–10.4)
CHLORIDE SERPL-SCNC: 103 MMOL/L (ref 98–110)
CO2 SERPL-SCNC: 31 MMOL/L (ref 20–32)
CORTIS AM PEAK SERPL-MCNC: 17.2 MCG/DL
CREAT SERPL-MCNC: 0.82 MG/DL (ref 0.5–1.03)
EOSINOPHIL # BLD AUTO: 193 CELLS/UL (ref 15–500)
EOSINOPHIL NFR BLD AUTO: 2.3 %
ERYTHROCYTE [DISTWIDTH] IN BLOOD BY AUTOMATED COUNT: 12.5 % (ref 11–15)
GFR/BSA.PRED SERPLBLD CYS-BASED-ARV: 85 ML/MIN/1.73M2
GLOBULIN SER CALC-MCNC: 3.1 G/DL (CALC) (ref 1.9–3.7)
GLUCOSE SERPL-MCNC: 97 MG/DL (ref 65–99)
HCT VFR BLD AUTO: 44.8 % (ref 35–45)
HGB BLD-MCNC: 14.1 G/DL (ref 11.7–15.5)
INSULIN SERPL-ACNC: 17.1 UIU/ML
LYMPHOCYTES # BLD AUTO: 2470 CELLS/UL (ref 850–3900)
LYMPHOCYTES NFR BLD AUTO: 29.4 %
MCH RBC QN AUTO: 27 PG (ref 27–33)
MCHC RBC AUTO-ENTMCNC: 31.5 G/DL (ref 32–36)
MCV RBC AUTO: 85.8 FL (ref 80–100)
MONOCYTES # BLD AUTO: 504 CELLS/UL (ref 200–950)
MONOCYTES NFR BLD AUTO: 6 %
NEUTROPHILS # BLD AUTO: 5200 CELLS/UL (ref 1500–7800)
NEUTROPHILS NFR BLD AUTO: 61.9 %
PLATELET # BLD AUTO: 356 THOUSAND/UL (ref 140–400)
PMV BLD REES-ECKER: 10.5 FL (ref 7.5–12.5)
POTASSIUM SERPL-SCNC: 4.4 MMOL/L (ref 3.5–5.3)
PROLACTIN SERPL-MCNC: 9.2 NG/ML
PROT SERPL-MCNC: 7.3 G/DL (ref 6.1–8.1)
RBC # BLD AUTO: 5.22 MILLION/UL (ref 3.8–5.1)
SODIUM SERPL-SCNC: 140 MMOL/L (ref 135–146)
T4 FREE SERPL-MCNC: 1 NG/DL (ref 0.8–1.8)
TSH SERPL-ACNC: 1.8 MIU/L
WBC # BLD AUTO: 8.4 THOUSAND/UL (ref 3.8–10.8)

## 2025-01-21 ENCOUNTER — RESULTS FOLLOW-UP (OUTPATIENT)
Dept: ENDOCRINOLOGY | Facility: HOSPITAL | Age: 55
End: 2025-01-21